# Patient Record
Sex: FEMALE | Race: WHITE | NOT HISPANIC OR LATINO | Employment: OTHER | URBAN - METROPOLITAN AREA
[De-identification: names, ages, dates, MRNs, and addresses within clinical notes are randomized per-mention and may not be internally consistent; named-entity substitution may affect disease eponyms.]

---

## 2019-08-19 ENCOUNTER — HOSPITAL ENCOUNTER (OUTPATIENT)
Dept: RADIOLOGY | Facility: HOSPITAL | Age: 74
Discharge: HOME | DRG: 189 | End: 2019-08-19
Attending: INTERNAL MEDICINE
Payer: MEDICARE

## 2019-08-19 ENCOUNTER — TELEPHONE (OUTPATIENT)
Dept: PULMONOLOGY | Facility: CLINIC | Age: 74
End: 2019-08-19

## 2019-08-19 ENCOUNTER — OFFICE VISIT (OUTPATIENT)
Dept: PULMONOLOGY | Facility: CLINIC | Age: 74
DRG: 189 | End: 2019-08-19
Payer: MEDICARE

## 2019-08-19 VITALS
HEIGHT: 58 IN | HEART RATE: 56 BPM | OXYGEN SATURATION: 95 % | BODY MASS INDEX: 26.93 KG/M2 | WEIGHT: 128.31 LBS | RESPIRATION RATE: 19 BRPM

## 2019-08-19 DIAGNOSIS — R06.02 SOBOE (SHORTNESS OF BREATH ON EXERTION): ICD-10-CM

## 2019-08-19 DIAGNOSIS — J96.11 CHRONIC HYPOXEMIC RESPIRATORY FAILURE: Primary | ICD-10-CM

## 2019-08-19 DIAGNOSIS — Z95.1 HX OF CABG: ICD-10-CM

## 2019-08-19 DIAGNOSIS — R06.83 SNORING: ICD-10-CM

## 2019-08-19 DIAGNOSIS — J90 BILATERAL PLEURAL EFFUSION: Primary | ICD-10-CM

## 2019-08-19 DIAGNOSIS — J90 BILATERAL PLEURAL EFFUSION: ICD-10-CM

## 2019-08-19 DIAGNOSIS — G47.19 EXCESSIVE DAYTIME SLEEPINESS: ICD-10-CM

## 2019-08-19 DIAGNOSIS — Z23 NEED FOR VACCINATION WITH 13-POLYVALENT PNEUMOCOCCAL CONJUGATE VACCINE: ICD-10-CM

## 2019-08-19 DIAGNOSIS — I50.9 CONGESTIVE HEART FAILURE, UNSPECIFIED HF CHRONICITY, UNSPECIFIED HEART FAILURE TYPE: ICD-10-CM

## 2019-08-19 DIAGNOSIS — R29.818 SUSPECTED SLEEP APNEA: ICD-10-CM

## 2019-08-19 PROCEDURE — 99999 PR PBB SHADOW E&M-NEW PATIENT-LVL III: ICD-10-PCS | Mod: PBBFAC,,, | Performed by: INTERNAL MEDICINE

## 2019-08-19 PROCEDURE — 99999 PR PBB SHADOW E&M-NEW PATIENT-LVL III: CPT | Mod: PBBFAC,,, | Performed by: INTERNAL MEDICINE

## 2019-08-19 PROCEDURE — 76604 US EXAM CHEST: CPT | Mod: TC

## 2019-08-19 PROCEDURE — 99205 PR OFFICE/OUTPT VISIT, NEW, LEVL V, 60-74 MIN: ICD-10-PCS | Mod: 25,S$PBB,, | Performed by: INTERNAL MEDICINE

## 2019-08-19 PROCEDURE — 99205 OFFICE O/P NEW HI 60 MIN: CPT | Mod: 25,S$PBB,, | Performed by: INTERNAL MEDICINE

## 2019-08-19 PROCEDURE — 99203 OFFICE O/P NEW LOW 30 MIN: CPT | Mod: PBBFAC,25 | Performed by: INTERNAL MEDICINE

## 2019-08-19 PROCEDURE — G0009 ADMIN PNEUMOCOCCAL VACCINE: HCPCS | Mod: PBBFAC

## 2019-08-19 PROCEDURE — 71046 XR CHEST PA AND LATERAL: ICD-10-PCS | Mod: 26,,, | Performed by: RADIOLOGY

## 2019-08-19 PROCEDURE — 71046 X-RAY EXAM CHEST 2 VIEWS: CPT | Mod: 26,,, | Performed by: RADIOLOGY

## 2019-08-19 PROCEDURE — 71046 X-RAY EXAM CHEST 2 VIEWS: CPT | Mod: TC

## 2019-08-19 RX ORDER — ISOSORBIDE MONONITRATE 30 MG/1
30 TABLET, EXTENDED RELEASE ORAL DAILY
Refills: 11 | COMMUNITY
Start: 2019-08-16 | End: 2020-02-19

## 2019-08-19 RX ORDER — NITROGLYCERIN 0.4 MG/1
0.4 TABLET SUBLINGUAL DAILY
Refills: 0 | COMMUNITY
Start: 2019-08-03 | End: 2020-02-19

## 2019-08-19 RX ORDER — NAPROXEN SODIUM 220 MG/1
81 TABLET, FILM COATED ORAL DAILY
COMMUNITY

## 2019-08-19 RX ORDER — METOPROLOL TARTRATE 50 MG/1
50 TABLET ORAL DAILY
Status: ON HOLD | COMMUNITY
Start: 2016-07-12 | End: 2019-08-23 | Stop reason: HOSPADM

## 2019-08-19 RX ORDER — ATORVASTATIN CALCIUM 40 MG/1
40 TABLET, FILM COATED ORAL DAILY
COMMUNITY

## 2019-08-19 RX ORDER — FUROSEMIDE 40 MG/1
40 TABLET ORAL 2 TIMES DAILY
COMMUNITY
End: 2020-01-08

## 2019-08-19 RX ORDER — LIDOCAINE HYDROCHLORIDE 10 MG/ML
1 INJECTION, SOLUTION EPIDURAL; INFILTRATION; INTRACAUDAL; PERINEURAL
Status: CANCELLED | OUTPATIENT
Start: 2019-08-19 | End: 2019-08-19

## 2019-08-19 RX ORDER — ECONAZOLE NITRATE 10 MG/G
1 CREAM TOPICAL
COMMUNITY
Start: 2019-07-26 | End: 2020-02-19

## 2019-08-19 NOTE — PATIENT INSTRUCTIONS
Heart healthy diet  Limit fluid intake 50-60 oz   Daily weights and to notify clinic if weight increases by more than 3 lbs in 1 day or 5 lbs in 1 week.       Your provider has scheduled you for a sleep study.   You should be receiving a phone call from the sleep lab shortly after your sleep study had been approved by your insurance. Please make sure you have your current phone number in the [facility] system. If you do not hear from anyone in the next 10 business days, please call the sleep lab at [phone] to schedule your sleep study.   The sleep studies are performed at [facility] seven nights a week. When you are scheduling your sleep study, they will also make you a follow up appointment with your provider. This follow up appointment will be 10-14 days after your sleep study to review the results. If it is noted that you do not have sleep apnea on your initial sleep study, you may receive a call back for a second night sleep study with the CPAP before you come back to the office.

## 2019-08-19 NOTE — H&P (VIEW-ONLY)
Initial Outpatient Pulmonary Evaluation       SUBJECTIVE:     Chief Complaint   Patient presents with    Congestive Heart Failure         History of Present Illness:    Patient is a 74 y.o. female referred for evaluation of chronic hypoxemic respiratory failure.    Patient known with CAD status post CABG about 8 years ago, late 2018 she underwent cardiac catheterization, no stents were placed.  Positive CAD.    Her cardiologist is Dr.Charles Laguerre with [facility].     She has been admitted twice with CHF exacerbation June 2019 and August 2019.    Currently wearing oxygen 24 hr.    She does complain of snoring, excessive daytime sleepiness with [place] Sleepiness Scale score today of 14, witnessed apnea by her family.    Denies smoking, denies personal family history of asthma.  No history of occupational exposure.    STOP - BANG Questionnaire:     1. Snoring : Do you snore loudly ?    Yes    2. Tired : Do you often feel tired, fatigued, or sleepy during daytime? Yes    3. Observed: Has anyone observed you stop breathing during your sleep?   Yes     4. Blood pressure : Do you have or are you being treated for high blood pressure?   Yes    5. BMI :BMI more than 35 kg/m2?   No    6. Age : Age over 50 yr old?   Yes    7. Neck circumference:   For male, is your shirt collar 17 inches / 43cm or larger?  For female, is your shirt collar 16 inches / 41cm or larger?    No    8. Gender: Gender male?   No    STOP BANG SCORE 5    High risk of DONAL: Yes 5 - 8  Intermediate risk of DONAL: Yes 3 - 4  Low risk of DONAL: Yes 0 - 2      References:   STOP Questionnaire   A Tool to Screen Patients for Obstructive Sleep Apnea: ZEUS Bauer.C.P.C., JAMEY Valero.B.B.S., Claudia Akins M.D.,Yvonne Gonzalez, Ph.D., Mariposa Bradley M.B.B.S.,_ Michele Fall.,_ José Manuel Martell M.D., Martin Rockwell F.R.C.P.C.; Anesthesiology 2008; 108:812-21 Copyright © 2008, the American  Society of Anesthesiologists, Inc. [employer]      Review of Systems   Constitutional: Negative for fever and chills.   HENT: Negative for nosebleeds.    Eyes: Negative for redness.   Respiratory: Positive for apnea, snoring, shortness of breath, dyspnea on extertion and somnolence. Negative for choking.    Cardiovascular: Positive for leg swelling.   Genitourinary: Negative for hematuria.   Endocrine: Negative for cold intolerance.    Musculoskeletal: Positive for arthralgias.   Skin: Negative for rash.   Gastrointestinal: Negative for vomiting.   Neurological: Negative for syncope.   Hematological: Negative for adenopathy.   Psychiatric/Behavioral: Positive for sleep disturbance. Negative for confusion.       Review of patient's allergies indicates:  No Known Allergies    Current Outpatient Medications   Medication Sig Dispense Refill    furosemide (LASIX) 40 MG tablet Take 40 mg by mouth 2 (two) times daily.      metoprolol tartrate (LOPRESSOR) 50 MG tablet Take 50 mg by mouth once daily.      aspirin 325 MG tablet Take 325 mg by mouth once daily.      atorvastatin (LIPITOR) 40 MG tablet Take 40 mg by mouth once daily.      econazole nitrate 1 % cream Apply 1 Doses/Fill topically as needed.      isosorbide mononitrate (IMDUR) 30 MG 24 hr tablet Take 30 mg by mouth 2 (two) times daily.  11    nitroGLYCERIN (NITROSTAT) 0.4 MG SL tablet Take 0.4 mg by mouth once daily.  0     No current facility-administered medications for this visit.        Past Medical History:   Diagnosis Date    CHF (congestive heart failure)     Diabetes     Hypertension     Renal arteriosclerosis      Past Surgical History:   Procedure Laterality Date    BYPASS GRAFT      GALLBLADDER SURGERY       History reviewed. No pertinent family history.  Social History     Tobacco Use    Smoking status: Never Smoker    Smokeless tobacco: Never Used   Substance Use Topics    Alcohol use: Not Currently      "Frequency: Never    Drug use: Not on file          OBJECTIVE:     Vital Signs (Most Recent)  Vital Signs  Pulse: (!) 56  Resp: 19  SpO2: 95 %(on 3 L)  Height and Weight  Height: 4' 10" (147.3 cm)  Weight: 58.2 kg (128 lb 4.9 oz)  BSA (Calculated - sq m): 1.54 sq meters  BMI (Calculated): 26.9  Weight in (lb) to have BMI = 25: 119.4]  Wt Readings from Last 2 Encounters:   08/19/19 58.2 kg (128 lb 4.9 oz)         Physical Exam:  Physical Exam   Constitutional: She is oriented to person, place, and time. She appears well-developed and well-nourished.   HENT:   Head: Normocephalic.   Neck: Neck supple.   Cardiovascular: Normal rate and regular rhythm.   Pulmonary/Chest: Normal expansion and effort normal. No stridor. No respiratory distress. She exhibits no tenderness.   Breath sounds decreased at bilateral bases   Abdominal: Soft.   Musculoskeletal: She exhibits edema. She exhibits no tenderness.   Lymphadenopathy:     She has no cervical adenopathy.   Neurological: She is alert and oriented to person, place, and time. Gait normal.   Skin: Skin is warm.   Psychiatric: She has a normal mood and affect. Her behavior is normal. Judgment and thought content normal.   Nursing note and vitals reviewed.      Laboratory  No results found for: WBC, RBC, HGB, HCT, MCV, MCH, MCHC, RDW, PLT, MPV, GRAN, LYMPH, MONO, EOS, BASO, EOSINOPHIL, BASOPHIL    BMP  No results found for: NA, K, CL, CO2, BUN, CREATININE, CALCIUM, ANIONGAP, ESTGFRAFRICA, EGFRNONAA, AST, ALT, PROT    No results found for: BNP    No results found for: TSH    No results found for: SEDRATE    No results found for: CRP      Diagnostic Results:    I have personally reviewed today the following studies :    Chest x-ray 8/18 2019 pulmonary congestion cardiomegaly and bilateral pleural effusion.      ASSESSMENT/PLAN:     Chronic hypoxemic respiratory failure  -     Brain natriuretic peptide; Future; Expected date: 08/19/2019  -     Stress test, pulmonary; Future  -     " Complete PFT without bronchodilator; Future    Congestive heart failure, unspecified HF chronicity, unspecified heart failure type  -     X-Ray Chest PA And Lateral; Future; Expected date: 08/19/2019  -     Brain natriuretic peptide; Future; Expected date: 08/19/2019  -     CBC auto differential; Future; Expected date: 08/19/2019  -     Basic metabolic panel; Future; Expected date: 08/19/2019  -     Stress test, pulmonary; Future    Hx of CABG    Suspected sleep apnea  -     Polysomnogram (CPAP will be added if patient meets diagnostic criteria.); Future    Snoring  -     Polysomnogram (CPAP will be added if patient meets diagnostic criteria.); Future    Excessive daytime sleepiness  -     Polysomnogram (CPAP will be added if patient meets diagnostic criteria.); Future    SOBOE (shortness of breath on exertion)  -     Stress test, pulmonary; Future  -     Complete PFT without bronchodilator; Future    Need for vaccination with 13-polyvalent pneumococcal conjugate vaccine  -     Pneumococcal Conjugate Vaccine (13 Valent) (IM)    Bilateral pleural effusion    Ultrasound of the chest bilateral today.  Might need thoracentesis.    Continue Lasix 40 mg twice a day.    Heart healthy diet  Limit fluid intake 50-60 oz   Daily weights and to notify clinic if weight increases by more than 3 lbs in 1 day or 5 lbs in 1 week.       MEDICAL DECISION MAKING: Moderate to high complexity.  Overall, the multiple problems listed are of moderate to high severity that may impact quality of life and activities of daily living. Side effects of medications, treatment plan as well as options and alternatives reviewed and discussed with patient. There was counseling of patient concerning these issues.     TIME SPENT WITH PATIENT: Time spent: 60 minutes in face to face  discussion concerning diagnosis, prognosis, review of lab and test results, benefits of treatment as well as management of disease, counseling of patient and coordination of  care between various health  care providers . Greater than half the time spent was used for coordination of care and counseling of patient.           Follow up in about 6 weeks (around 9/30/2019).    This note was prepared using voice recognition system and is likely to have sound alike errors that may have been overlooked even after proof reading.  Please call me with any questions    Discussed diagnosis, its evaluation, treatment and usual course. All questions answered.    Thank you for the courtesy of participating in the care of this patient    John Landry MD

## 2019-08-19 NOTE — PROGRESS NOTES
Initial Outpatient Pulmonary Evaluation       SUBJECTIVE:     Chief Complaint   Patient presents with    Congestive Heart Failure         History of Present Illness:    Patient is a 74 y.o. female referred for evaluation of chronic hypoxemic respiratory failure.    Patient known with CAD status post CABG about 8 years ago, late 2018 she underwent cardiac catheterization, no stents were placed.  Positive CAD.    Her cardiologist is Dr.Charles Laguerre with [facility].     She has been admitted twice with CHF exacerbation June 2019 and August 2019.    Currently wearing oxygen 24 hr.    She does complain of snoring, excessive daytime sleepiness with [place] Sleepiness Scale score today of 14, witnessed apnea by her family.    Denies smoking, denies personal family history of asthma.  No history of occupational exposure.    STOP - BANG Questionnaire:     1. Snoring : Do you snore loudly ?    Yes    2. Tired : Do you often feel tired, fatigued, or sleepy during daytime? Yes    3. Observed: Has anyone observed you stop breathing during your sleep?   Yes     4. Blood pressure : Do you have or are you being treated for high blood pressure?   Yes    5. BMI :BMI more than 35 kg/m2?   No    6. Age : Age over 50 yr old?   Yes    7. Neck circumference:   For male, is your shirt collar 17 inches / 43cm or larger?  For female, is your shirt collar 16 inches / 41cm or larger?    No    8. Gender: Gender male?   No    STOP BANG SCORE 5    High risk of DONAL: Yes 5 - 8  Intermediate risk of DONAL: Yes 3 - 4  Low risk of DONAL: Yes 0 - 2      References:   STOP Questionnaire   A Tool to Screen Patients for Obstructive Sleep Apnea: ZEUS Bauer.C.P.C., JAMEY Valero.B.B.S., Claudia Akins M.D.,Yvonne Gonzalez, Ph.D., Mariposa Bradley M.B.B.S.,_ Michele Fall.,_ José Manuel Martell M.D., Martin Rockwell F.R.C.P.C.; Anesthesiology 2008; 108:812-21 Copyright © 2008, the American  Society of Anesthesiologists, Inc. [employer]      Review of Systems   Constitutional: Negative for fever and chills.   HENT: Negative for nosebleeds.    Eyes: Negative for redness.   Respiratory: Positive for apnea, snoring, shortness of breath, dyspnea on extertion and somnolence. Negative for choking.    Cardiovascular: Positive for leg swelling.   Genitourinary: Negative for hematuria.   Endocrine: Negative for cold intolerance.    Musculoskeletal: Positive for arthralgias.   Skin: Negative for rash.   Gastrointestinal: Negative for vomiting.   Neurological: Negative for syncope.   Hematological: Negative for adenopathy.   Psychiatric/Behavioral: Positive for sleep disturbance. Negative for confusion.       Review of patient's allergies indicates:  No Known Allergies    Current Outpatient Medications   Medication Sig Dispense Refill    furosemide (LASIX) 40 MG tablet Take 40 mg by mouth 2 (two) times daily.      metoprolol tartrate (LOPRESSOR) 50 MG tablet Take 50 mg by mouth once daily.      aspirin 325 MG tablet Take 325 mg by mouth once daily.      atorvastatin (LIPITOR) 40 MG tablet Take 40 mg by mouth once daily.      econazole nitrate 1 % cream Apply 1 Doses/Fill topically as needed.      isosorbide mononitrate (IMDUR) 30 MG 24 hr tablet Take 30 mg by mouth 2 (two) times daily.  11    nitroGLYCERIN (NITROSTAT) 0.4 MG SL tablet Take 0.4 mg by mouth once daily.  0     No current facility-administered medications for this visit.        Past Medical History:   Diagnosis Date    CHF (congestive heart failure)     Diabetes     Hypertension     Renal arteriosclerosis      Past Surgical History:   Procedure Laterality Date    BYPASS GRAFT      GALLBLADDER SURGERY       History reviewed. No pertinent family history.  Social History     Tobacco Use    Smoking status: Never Smoker    Smokeless tobacco: Never Used   Substance Use Topics    Alcohol use: Not Currently      "Frequency: Never    Drug use: Not on file          OBJECTIVE:     Vital Signs (Most Recent)  Vital Signs  Pulse: (!) 56  Resp: 19  SpO2: 95 %(on 3 L)  Height and Weight  Height: 4' 10" (147.3 cm)  Weight: 58.2 kg (128 lb 4.9 oz)  BSA (Calculated - sq m): 1.54 sq meters  BMI (Calculated): 26.9  Weight in (lb) to have BMI = 25: 119.4]  Wt Readings from Last 2 Encounters:   08/19/19 58.2 kg (128 lb 4.9 oz)         Physical Exam:  Physical Exam   Constitutional: She is oriented to person, place, and time. She appears well-developed and well-nourished.   HENT:   Head: Normocephalic.   Neck: Neck supple.   Cardiovascular: Normal rate and regular rhythm.   Pulmonary/Chest: Normal expansion and effort normal. No stridor. No respiratory distress. She exhibits no tenderness.   Breath sounds decreased at bilateral bases   Abdominal: Soft.   Musculoskeletal: She exhibits edema. She exhibits no tenderness.   Lymphadenopathy:     She has no cervical adenopathy.   Neurological: She is alert and oriented to person, place, and time. Gait normal.   Skin: Skin is warm.   Psychiatric: She has a normal mood and affect. Her behavior is normal. Judgment and thought content normal.   Nursing note and vitals reviewed.      Laboratory  No results found for: WBC, RBC, HGB, HCT, MCV, MCH, MCHC, RDW, PLT, MPV, GRAN, LYMPH, MONO, EOS, BASO, EOSINOPHIL, BASOPHIL    BMP  No results found for: NA, K, CL, CO2, BUN, CREATININE, CALCIUM, ANIONGAP, ESTGFRAFRICA, EGFRNONAA, AST, ALT, PROT    No results found for: BNP    No results found for: TSH    No results found for: SEDRATE    No results found for: CRP      Diagnostic Results:    I have personally reviewed today the following studies :    Chest x-ray 8/18 2019 pulmonary congestion cardiomegaly and bilateral pleural effusion.      ASSESSMENT/PLAN:     Chronic hypoxemic respiratory failure  -     Brain natriuretic peptide; Future; Expected date: 08/19/2019  -     Stress test, pulmonary; Future  -     " Complete PFT without bronchodilator; Future    Congestive heart failure, unspecified HF chronicity, unspecified heart failure type  -     X-Ray Chest PA And Lateral; Future; Expected date: 08/19/2019  -     Brain natriuretic peptide; Future; Expected date: 08/19/2019  -     CBC auto differential; Future; Expected date: 08/19/2019  -     Basic metabolic panel; Future; Expected date: 08/19/2019  -     Stress test, pulmonary; Future    Hx of CABG    Suspected sleep apnea  -     Polysomnogram (CPAP will be added if patient meets diagnostic criteria.); Future    Snoring  -     Polysomnogram (CPAP will be added if patient meets diagnostic criteria.); Future    Excessive daytime sleepiness  -     Polysomnogram (CPAP will be added if patient meets diagnostic criteria.); Future    SOBOE (shortness of breath on exertion)  -     Stress test, pulmonary; Future  -     Complete PFT without bronchodilator; Future    Need for vaccination with 13-polyvalent pneumococcal conjugate vaccine  -     Pneumococcal Conjugate Vaccine (13 Valent) (IM)    Bilateral pleural effusion    Ultrasound of the chest bilateral today.  Might need thoracentesis.    Continue Lasix 40 mg twice a day.    Heart healthy diet  Limit fluid intake 50-60 oz   Daily weights and to notify clinic if weight increases by more than 3 lbs in 1 day or 5 lbs in 1 week.       MEDICAL DECISION MAKING: Moderate to high complexity.  Overall, the multiple problems listed are of moderate to high severity that may impact quality of life and activities of daily living. Side effects of medications, treatment plan as well as options and alternatives reviewed and discussed with patient. There was counseling of patient concerning these issues.     TIME SPENT WITH PATIENT: Time spent: 60 minutes in face to face  discussion concerning diagnosis, prognosis, review of lab and test results, benefits of treatment as well as management of disease, counseling of patient and coordination of  care between various health  care providers . Greater than half the time spent was used for coordination of care and counseling of patient.           Follow up in about 6 weeks (around 9/30/2019).    This note was prepared using voice recognition system and is likely to have sound alike errors that may have been overlooked even after proof reading.  Please call me with any questions    Discussed diagnosis, its evaluation, treatment and usual course. All questions answered.    Thank you for the courtesy of participating in the care of this patient    John Landry MD

## 2019-08-22 ENCOUNTER — HOSPITAL ENCOUNTER (INPATIENT)
Facility: HOSPITAL | Age: 74
LOS: 1 days | Discharge: HOME | DRG: 189 | End: 2019-08-23
Attending: INTERNAL MEDICINE | Admitting: EMERGENCY MEDICINE
Payer: MEDICARE

## 2019-08-22 DIAGNOSIS — E78.49 OTHER HYPERLIPIDEMIA: Chronic | ICD-10-CM

## 2019-08-22 DIAGNOSIS — R00.1 BRADYCARDIA, SINUS: ICD-10-CM

## 2019-08-22 DIAGNOSIS — J90 BILATERAL PLEURAL EFFUSION: Primary | ICD-10-CM

## 2019-08-22 DIAGNOSIS — I10 ESSENTIAL HYPERTENSION: Chronic | ICD-10-CM

## 2019-08-22 DIAGNOSIS — Z79.4 TYPE 2 DIABETES MELLITUS WITH HYPERGLYCEMIA, WITH LONG-TERM CURRENT USE OF INSULIN: Chronic | ICD-10-CM

## 2019-08-22 DIAGNOSIS — E11.65 TYPE 2 DIABETES MELLITUS WITH HYPERGLYCEMIA, WITH LONG-TERM CURRENT USE OF INSULIN: Chronic | ICD-10-CM

## 2019-08-22 DIAGNOSIS — J96.21 ACUTE ON CHRONIC RESPIRATORY FAILURE WITH HYPOXIA: ICD-10-CM

## 2019-08-22 DIAGNOSIS — R09.02 HYPOXEMIA: ICD-10-CM

## 2019-08-22 DIAGNOSIS — I25.10 CORONARY ARTERY DISEASE INVOLVING NATIVE CORONARY ARTERY OF NATIVE HEART WITHOUT ANGINA PECTORIS: Chronic | ICD-10-CM

## 2019-08-22 PROBLEM — J96.01 ACUTE HYPOXEMIC RESPIRATORY FAILURE: Status: ACTIVE | Noted: 2019-08-22

## 2019-08-22 LAB
ALBUMIN FLD-MCNC: 1.5 G/DL
ALBUMIN SERPL BCP-MCNC: 3.4 G/DL (ref 3.5–5.2)
ALP SERPL-CCNC: 92 U/L (ref 55–135)
ALT SERPL W/O P-5'-P-CCNC: 27 U/L (ref 10–44)
ANION GAP SERPL CALC-SCNC: 13 MMOL/L (ref 8–16)
APPEARANCE FLD: CLEAR
AST SERPL-CCNC: 19 U/L (ref 10–40)
BASOPHILS # BLD AUTO: 0.04 K/UL (ref 0–0.2)
BASOPHILS NFR BLD: 0.4 % (ref 0–1.9)
BILIRUB SERPL-MCNC: 0.5 MG/DL (ref 0.1–1)
BNP SERPL-MCNC: 574 PG/ML (ref 0–99)
BODY FLD TYPE: NORMAL
BODY FLUID SOURCE, LDH: NORMAL
BUN SERPL-MCNC: 38 MG/DL (ref 8–23)
CALCIUM SERPL-MCNC: 9.7 MG/DL (ref 8.7–10.5)
CHLORIDE SERPL-SCNC: 99 MMOL/L (ref 95–110)
CO2 SERPL-SCNC: 29 MMOL/L (ref 23–29)
COLOR FLD: YELLOW
CREAT SERPL-MCNC: 1.2 MG/DL (ref 0.5–1.4)
DIFFERENTIAL METHOD: ABNORMAL
EOSINOPHIL # BLD AUTO: 0.5 K/UL (ref 0–0.5)
EOSINOPHIL NFR BLD: 4.6 % (ref 0–8)
ERYTHROCYTE [DISTWIDTH] IN BLOOD BY AUTOMATED COUNT: 15.2 % (ref 11.5–14.5)
EST. GFR  (AFRICAN AMERICAN): 51 ML/MIN/1.73 M^2
EST. GFR  (NON AFRICAN AMERICAN): 45 ML/MIN/1.73 M^2
GLUCOSE FLD-MCNC: 136 MG/DL
GLUCOSE SERPL-MCNC: 101 MG/DL (ref 70–110)
HCT VFR BLD AUTO: 33.7 % (ref 37–48.5)
HGB BLD-MCNC: 10.8 G/DL (ref 12–16)
LDH FLD L TO P-CCNC: 58 U/L
LYMPHOCYTES # BLD AUTO: 0.6 K/UL (ref 1–4.8)
LYMPHOCYTES NFR BLD: 5.3 % (ref 18–48)
LYMPHOCYTES NFR FLD MANUAL: 27 %
MCH RBC QN AUTO: 30.3 PG (ref 27–31)
MCHC RBC AUTO-ENTMCNC: 32 G/DL (ref 32–36)
MCV RBC AUTO: 94 FL (ref 82–98)
MONOCYTES # BLD AUTO: 0.6 K/UL (ref 0.3–1)
MONOCYTES NFR BLD: 6.1 % (ref 4–15)
MONOS+MACROS NFR FLD MANUAL: 71 %
NEUTROPHILS # BLD AUTO: 8.8 K/UL (ref 1.8–7.7)
NEUTROPHILS NFR BLD: 83.9 % (ref 38–73)
NEUTROPHILS NFR FLD MANUAL: 2 %
PLATELET # BLD AUTO: 351 K/UL (ref 150–350)
PMV BLD AUTO: 9.3 FL (ref 9.2–12.9)
POCT GLUCOSE: 128 MG/DL (ref 70–110)
POCT GLUCOSE: 199 MG/DL (ref 70–110)
POCT GLUCOSE: 43 MG/DL (ref 70–110)
POTASSIUM SERPL-SCNC: 3.8 MMOL/L (ref 3.5–5.1)
PROT FLD-MCNC: 2.1 G/DL
PROT SERPL-MCNC: 7.2 G/DL (ref 6–8.4)
RBC # BLD AUTO: 3.57 M/UL (ref 4–5.4)
SODIUM SERPL-SCNC: 141 MMOL/L (ref 136–145)
SPECIMEN SOURCE: NORMAL
TROPONIN I SERPL DL<=0.01 NG/ML-MCNC: <0.006 NG/ML (ref 0–0.03)
WBC # BLD AUTO: 10.49 K/UL (ref 3.9–12.7)
WBC # FLD: 172 /CU MM

## 2019-08-22 PROCEDURE — 27000190 HC CPAP FULL FACE MASK W/VALVE

## 2019-08-22 PROCEDURE — 87205 SMEAR GRAM STAIN: CPT

## 2019-08-22 PROCEDURE — 80053 COMPREHEN METABOLIC PANEL: CPT

## 2019-08-22 PROCEDURE — 83880 ASSAY OF NATRIURETIC PEPTIDE: CPT

## 2019-08-22 PROCEDURE — 84484 ASSAY OF TROPONIN QUANT: CPT

## 2019-08-22 PROCEDURE — 87070 CULTURE OTHR SPECIMN AEROBIC: CPT

## 2019-08-22 PROCEDURE — 85025 COMPLETE CBC W/AUTO DIFF WBC: CPT

## 2019-08-22 PROCEDURE — 32555 PR THORACEN W/IMAG GUIDANCE: ICD-10-PCS | Mod: RT,,, | Performed by: INTERNAL MEDICINE

## 2019-08-22 PROCEDURE — 88305 TISSUE EXAM BY PATHOLOGIST: CPT | Mod: 26,,, | Performed by: PATHOLOGY

## 2019-08-22 PROCEDURE — 94660 CPAP INITIATION&MGMT: CPT

## 2019-08-22 PROCEDURE — 94640 AIRWAY INHALATION TREATMENT: CPT

## 2019-08-22 PROCEDURE — 88112 CYTOPATH CELL ENHANCE TECH: CPT | Mod: 26,,, | Performed by: PATHOLOGY

## 2019-08-22 PROCEDURE — 25000242 PHARM REV CODE 250 ALT 637 W/ HCPCS: Performed by: NURSE PRACTITIONER

## 2019-08-22 PROCEDURE — 93010 ELECTROCARDIOGRAM REPORT: CPT | Mod: ,,, | Performed by: INTERNAL MEDICINE

## 2019-08-22 PROCEDURE — 93005 ELECTROCARDIOGRAM TRACING: CPT

## 2019-08-22 PROCEDURE — 93010 EKG 12-LEAD: ICD-10-PCS | Mod: ,,, | Performed by: INTERNAL MEDICINE

## 2019-08-22 PROCEDURE — 89051 BODY FLUID CELL COUNT: CPT

## 2019-08-22 PROCEDURE — 99900035 HC TECH TIME PER 15 MIN (STAT)

## 2019-08-22 PROCEDURE — 88112 CYTOLOGY SPECIMEN- MEDICAL CYTOLOGY (FLUID/WASH/BRUSH): ICD-10-PCS | Mod: 26,,, | Performed by: PATHOLOGY

## 2019-08-22 PROCEDURE — 25000003 PHARM REV CODE 250: Performed by: NURSE PRACTITIONER

## 2019-08-22 PROCEDURE — 20000000 HC ICU ROOM

## 2019-08-22 PROCEDURE — 83615 LACTATE (LD) (LDH) ENZYME: CPT

## 2019-08-22 PROCEDURE — 99291 CRITICAL CARE FIRST HOUR: CPT | Mod: 25,,, | Performed by: NURSE PRACTITIONER

## 2019-08-22 PROCEDURE — 84157 ASSAY OF PROTEIN OTHER: CPT

## 2019-08-22 PROCEDURE — 63600175 PHARM REV CODE 636 W HCPCS: Performed by: NURSE PRACTITIONER

## 2019-08-22 PROCEDURE — 82042 OTHER SOURCE ALBUMIN QUAN EA: CPT

## 2019-08-22 PROCEDURE — 32555 ASPIRATE PLEURA W/ IMAGING: CPT | Mod: RT | Performed by: INTERNAL MEDICINE

## 2019-08-22 PROCEDURE — 88305 CYTOLOGY SPECIMEN- MEDICAL CYTOLOGY (FLUID/WASH/BRUSH): ICD-10-PCS | Mod: 26,,, | Performed by: PATHOLOGY

## 2019-08-22 PROCEDURE — 88305 TISSUE EXAM BY PATHOLOGIST: CPT | Performed by: PATHOLOGY

## 2019-08-22 PROCEDURE — 99291 PR CRITICAL CARE, E/M 30-74 MINUTES: ICD-10-PCS | Mod: 25,,, | Performed by: NURSE PRACTITIONER

## 2019-08-22 PROCEDURE — 82945 GLUCOSE OTHER FLUID: CPT

## 2019-08-22 PROCEDURE — 25000242 PHARM REV CODE 250 ALT 637 W/ HCPCS: Performed by: INTERNAL MEDICINE

## 2019-08-22 PROCEDURE — 32555 ASPIRATE PLEURA W/ IMAGING: CPT | Mod: RT,,, | Performed by: INTERNAL MEDICINE

## 2019-08-22 PROCEDURE — 27000221 HC OXYGEN, UP TO 24 HOURS

## 2019-08-22 RX ORDER — CARVEDILOL 25 MG/1
25 TABLET ORAL 2 TIMES DAILY WITH MEALS
COMMUNITY
End: 2020-02-19

## 2019-08-22 RX ORDER — IPRATROPIUM BROMIDE AND ALBUTEROL SULFATE 2.5; .5 MG/3ML; MG/3ML
3 SOLUTION RESPIRATORY (INHALATION)
Status: DISCONTINUED | OUTPATIENT
Start: 2019-08-22 | End: 2019-08-23 | Stop reason: HOSPADM

## 2019-08-22 RX ORDER — ASPIRIN 325 MG
325 TABLET ORAL DAILY
Status: DISCONTINUED | OUTPATIENT
Start: 2019-08-23 | End: 2019-08-23 | Stop reason: HOSPADM

## 2019-08-22 RX ORDER — LOSARTAN POTASSIUM 50 MG/1
50 TABLET ORAL DAILY
Status: DISCONTINUED | OUTPATIENT
Start: 2019-08-23 | End: 2019-08-23 | Stop reason: HOSPADM

## 2019-08-22 RX ORDER — FUROSEMIDE 10 MG/ML
40 INJECTION INTRAMUSCULAR; INTRAVENOUS ONCE
Status: COMPLETED | OUTPATIENT
Start: 2019-08-22 | End: 2019-08-22

## 2019-08-22 RX ORDER — ISOSORBIDE MONONITRATE 30 MG/1
30 TABLET, EXTENDED RELEASE ORAL DAILY
Status: DISCONTINUED | OUTPATIENT
Start: 2019-08-23 | End: 2019-08-23 | Stop reason: HOSPADM

## 2019-08-22 RX ORDER — INSULIN ASPART 100 [IU]/ML
10 INJECTION, SOLUTION INTRAVENOUS; SUBCUTANEOUS
COMMUNITY
End: 2020-01-08

## 2019-08-22 RX ORDER — GLUCAGON 1 MG
1 KIT INJECTION
Status: DISCONTINUED | OUTPATIENT
Start: 2019-08-22 | End: 2019-08-23 | Stop reason: HOSPADM

## 2019-08-22 RX ORDER — IPRATROPIUM BROMIDE AND ALBUTEROL SULFATE 2.5; .5 MG/3ML; MG/3ML
3 SOLUTION RESPIRATORY (INHALATION) ONCE
Status: DISCONTINUED | OUTPATIENT
Start: 2019-08-22 | End: 2019-08-22

## 2019-08-22 RX ORDER — CARVEDILOL 12.5 MG/1
25 TABLET ORAL 2 TIMES DAILY WITH MEALS
Status: DISCONTINUED | OUTPATIENT
Start: 2019-08-22 | End: 2019-08-22

## 2019-08-22 RX ORDER — CALCIUM CARBONATE 500(1250)
1 TABLET ORAL 2 TIMES DAILY
COMMUNITY
End: 2020-01-08

## 2019-08-22 RX ORDER — ACETAMINOPHEN 325 MG/1
650 TABLET ORAL EVERY 6 HOURS PRN
Status: DISCONTINUED | OUTPATIENT
Start: 2019-08-22 | End: 2019-08-23 | Stop reason: HOSPADM

## 2019-08-22 RX ORDER — METHYLPREDNISOLONE 4 MG
1 TABLET, DOSE PACK ORAL 2 TIMES DAILY
COMMUNITY
End: 2020-01-08

## 2019-08-22 RX ORDER — ASCORBIC ACID 500 MG
500 TABLET ORAL DAILY
COMMUNITY
End: 2020-01-08

## 2019-08-22 RX ORDER — HYDRALAZINE HYDROCHLORIDE 100 MG/1
100 TABLET, FILM COATED ORAL 2 TIMES DAILY
Status: ON HOLD | COMMUNITY
End: 2019-08-23 | Stop reason: HOSPADM

## 2019-08-22 RX ORDER — LOSARTAN POTASSIUM 50 MG/1
50 TABLET ORAL 2 TIMES DAILY
COMMUNITY
End: 2020-01-08

## 2019-08-22 RX ORDER — ENOXAPARIN SODIUM 100 MG/ML
40 INJECTION SUBCUTANEOUS EVERY 24 HOURS
Status: DISCONTINUED | OUTPATIENT
Start: 2019-08-22 | End: 2019-08-23 | Stop reason: HOSPADM

## 2019-08-22 RX ORDER — ONDANSETRON 2 MG/ML
4 INJECTION INTRAMUSCULAR; INTRAVENOUS EVERY 8 HOURS PRN
Status: DISCONTINUED | OUTPATIENT
Start: 2019-08-22 | End: 2019-08-23 | Stop reason: HOSPADM

## 2019-08-22 RX ORDER — HYDRALAZINE HYDROCHLORIDE 50 MG/1
100 TABLET, FILM COATED ORAL 2 TIMES DAILY
Status: DISCONTINUED | OUTPATIENT
Start: 2019-08-22 | End: 2019-08-22

## 2019-08-22 RX ORDER — IPRATROPIUM BROMIDE AND ALBUTEROL SULFATE 2.5; .5 MG/3ML; MG/3ML
3 SOLUTION RESPIRATORY (INHALATION) ONCE
Status: COMPLETED | OUTPATIENT
Start: 2019-08-22 | End: 2019-08-22

## 2019-08-22 RX ORDER — IBUPROFEN 200 MG
16 TABLET ORAL
Status: DISCONTINUED | OUTPATIENT
Start: 2019-08-22 | End: 2019-08-23 | Stop reason: HOSPADM

## 2019-08-22 RX ORDER — INSULIN ASPART 100 [IU]/ML
1-10 INJECTION, SOLUTION INTRAVENOUS; SUBCUTANEOUS
Status: DISCONTINUED | OUTPATIENT
Start: 2019-08-22 | End: 2019-08-23 | Stop reason: HOSPADM

## 2019-08-22 RX ORDER — ATORVASTATIN CALCIUM 40 MG/1
40 TABLET, FILM COATED ORAL DAILY
Status: DISCONTINUED | OUTPATIENT
Start: 2019-08-23 | End: 2019-08-23 | Stop reason: HOSPADM

## 2019-08-22 RX ORDER — CHOLECALCIFEROL (VITAMIN D3) 25 MCG
1000 TABLET ORAL NIGHTLY
COMMUNITY
End: 2020-01-08

## 2019-08-22 RX ORDER — FUROSEMIDE 40 MG/1
40 TABLET ORAL 2 TIMES DAILY
Status: DISCONTINUED | OUTPATIENT
Start: 2019-08-23 | End: 2019-08-23 | Stop reason: HOSPADM

## 2019-08-22 RX ORDER — DOCUSATE SODIUM 100 MG/1
100 CAPSULE, LIQUID FILLED ORAL DAILY
Status: DISCONTINUED | OUTPATIENT
Start: 2019-08-23 | End: 2019-08-23 | Stop reason: HOSPADM

## 2019-08-22 RX ORDER — LANOLIN ALCOHOL/MO/W.PET/CERES
400 CREAM (GRAM) TOPICAL 2 TIMES DAILY
COMMUNITY
End: 2020-01-08

## 2019-08-22 RX ORDER — LIDOCAINE HYDROCHLORIDE 10 MG/ML
1 INJECTION, SOLUTION EPIDURAL; INFILTRATION; INTRACAUDAL; PERINEURAL
Status: DISCONTINUED | OUTPATIENT
Start: 2019-08-22 | End: 2019-08-22 | Stop reason: HOSPADM

## 2019-08-22 RX ORDER — IBUPROFEN 200 MG
24 TABLET ORAL
Status: DISCONTINUED | OUTPATIENT
Start: 2019-08-22 | End: 2019-08-23 | Stop reason: HOSPADM

## 2019-08-22 RX ORDER — BISACODYL 10 MG
10 SUPPOSITORY, RECTAL RECTAL DAILY PRN
Status: DISCONTINUED | OUTPATIENT
Start: 2019-08-22 | End: 2019-08-23 | Stop reason: HOSPADM

## 2019-08-22 RX ORDER — FAMOTIDINE 20 MG/1
20 TABLET, FILM COATED ORAL DAILY
Status: DISCONTINUED | OUTPATIENT
Start: 2019-08-22 | End: 2019-08-23

## 2019-08-22 RX ORDER — NIFEDIPINE 60 MG/1
60 TABLET, EXTENDED RELEASE ORAL 2 TIMES DAILY
COMMUNITY

## 2019-08-22 RX ADMIN — IPRATROPIUM BROMIDE AND ALBUTEROL SULFATE 3 ML: .5; 3 SOLUTION RESPIRATORY (INHALATION) at 07:08

## 2019-08-22 RX ADMIN — IPRATROPIUM BROMIDE AND ALBUTEROL SULFATE 3 ML: .5; 3 SOLUTION RESPIRATORY (INHALATION) at 01:08

## 2019-08-22 RX ADMIN — FAMOTIDINE 20 MG: 20 TABLET, FILM COATED ORAL at 05:08

## 2019-08-22 RX ADMIN — FUROSEMIDE 40 MG: 10 INJECTION, SOLUTION INTRAMUSCULAR; INTRAVENOUS at 05:08

## 2019-08-22 RX ADMIN — ENOXAPARIN SODIUM 40 MG: 100 INJECTION SUBCUTANEOUS at 05:08

## 2019-08-22 NOTE — ASSESSMENT & PLAN NOTE
Continue ASA, Imdur, Statin, and ARB   Hold B blocker for now due to Bradycardia   Telemetry monitor

## 2019-08-22 NOTE — DISCHARGE SUMMARY
Date of Procedure: 08/22/2019      Procedure: Thoracentesis     Indications: Therapeutic     Pre-Operative Diagnosis:  Right side effusion      Post-Operative Diagnosis: same     Anesthesia: local     Technical Procedures Used: US guided over the needle cath thoracentesis      Description of the Findings of the Procedure: 700 ml right side effsuion , about 400 ml left side      Consent: Informed consent was obtained. Risks of the procedure were discussed including: infection, bleeding, pain, pneumothorax.     Under sterile conditions the patient was positioned. Chloraprep  solution and sterile drapes were utilized. 5 cc 1% plain lidocaine was used to anesthetize between the rib space after localized under ultrasound. Fluid was obtained after catheter inserted without  difficulty and suction applied with minimal blood loss.  A dressing was applied to the wound and wound care instructions were provided.      800 ml of clear pleural fluid was obtained. A sample was sent to Pathology and cell counts, as well as for infection analysis.     Plan:     A follow up chest x-ray was ordered. Yes  Tylenol 650 mg. for pain.     Significant Surgical Tasks Conducted by the Assistant(s), if Applicable:     Complications: none     Estimated Blood Loss (EBL): Minimal     Attestation: I performed the procedure.  John Landry M.D

## 2019-08-22 NOTE — OP NOTE
Date of Procedure: 08/22/2019      Procedure: Thoracentesis     Indications: Therapeutic     Pre-Operative Diagnosis:  Right side effusion      Post-Operative Diagnosis: same     Anesthesia: local     Technical Procedures Used: US guided over the needle cath thoracentesis      Description of the Findings of the Procedure: 700 ml right side effsuion , about 400 ml left side      Consent: Informed consent was obtained. Risks of the procedure were discussed including: infection, bleeding, pain, pneumothorax.     Under sterile conditions the patient was positioned. Chloraprep  solution and sterile drapes were utilized. 5 cc 1% plain lidocaine was used to anesthetize between the rib space after localized under ultrasound. Fluid was obtained after catheter inserted without  difficulty and suction applied with minimal blood loss.  A dressing was applied to the wound and wound care instructions were provided.      800 ml of clear pleural fluid was obtained. A sample was sent to Pathology and cell counts, as well as for infection analysis.     Plan:     A follow up chest x-ray was ordered. Yes.   Tylenol 650 mg. for pain.     Significant Surgical Tasks Conducted by the Assistant(s), if Applicable:     Complications: acute on chronic hypoxemia and resp distress, stable on BIPAP. Will admit patient for acute on chronciresp failure / possible re expansion pulm edema, no PTX noted on CXR post thoracentesis on the right .     Estimated Blood Loss (EBL): Minimal     Attestation: I performed the procedure.      John Landry M.D

## 2019-08-22 NOTE — PLAN OF CARE
Problem: Adult Inpatient Plan of Care  Goal: Plan of Care Review  Outcome: Ongoing (interventions implemented as appropriate)  Patient admitted s/p thoracentesis. Transferred on Bipap from endo to ICU. Weaned to 2L NC by NP.tolerated well. OOB to toilet. desat to 88%, but recovered quickly once back to bed. O2 sat=95%. Home BP meds restarted. Pt CBG=43. Pt requesting juice and crackers instead of glucose tabs. NP notified. David crackers and apple juice given. Diet ordered. Repositions self in bed.

## 2019-08-22 NOTE — H&P
[facility] Hospital Medicine  History & Physical    Patient Name: Mercy Woodruff  MRN: [id number]  Admission Date: 2019  Attending Physician: Rashaun Briscoe MD   Primary Care Provider: Manda Almendarez MD         Patient information was obtained from patient, relative(s) and ER records.     Subjective:     Principal Problem:Acute on chronic respiratory failure with hypoxia    Chief Complaint: No chief complaint on file.       HPI: Ms Woodruff is a 74 year old female with PMHx of CAD, HTN, HLD, DM who underwent Rt thoracenthesis on today by Dr Landry. She experienced some shortness of breath post procedure requiring application of BiPap. Patient was admitted to ICU for further evaluation. Troponin negative. , which is down from 939 three days ago. Vital signs and labs stable. Bipap has currently been wean, she it tolerating NC. Patient is being admitted to ICU under the care of [facility]. She is a full code, daughter Merlyn Ahn is surrogate decision maker.      Past Medical History:   Diagnosis Date    Arthritis     CHF (congestive heart failure)     Diabetes     Hypertension     Renal arteriosclerosis        Past Surgical History:   Procedure Laterality Date    BYPASS GRAFT            GALLBLADDER SURGERY      HYSTERECTOMY         Review of patient's allergies indicates:  No Known Allergies    No current facility-administered medications on file prior to encounter.      Current Outpatient Medications on File Prior to Encounter   Medication Sig    ascorbic acid, vitamin C, (VITAMIN C) 500 MG tablet Take 500 mg by mouth once daily.    aspirin 81 MG Chew Take 81 mg by mouth once daily.     atorvastatin (LIPITOR) 40 MG tablet Take 40 mg by mouth once daily.    calcium carbonate (OS-ULICES) 500 mg calcium (1,250 mg) tablet Take 1 tablet by mouth 2 (two) times daily.    carvedilol (COREG) 25 MG tablet Take 25 mg by mouth 2 (two) times daily with meals.     econazole nitrate 1 % cream Apply 1 Doses/Fill topically as needed.    fluoxetine HCl (PROZAC ORAL) Take 30 mg by mouth once daily.     furosemide (LASIX) 40 MG tablet Take 40 mg by mouth 2 (two) times daily.    glucosamine sulfate 500 mg Tab Take 1 capsule by mouth 2 (two) times daily.    hydrALAZINE (APRESOLINE) 100 MG tablet Take 100 mg by mouth 2 (two) times daily.    insulin aspart U-100 (NOVOLOG) 100 unit/mL injection Inject 10 Units into the skin 3 (three) times daily before meals. Not taking    insulin detemir U-100 (LEVEMIR) 100 unit/mL injection Inject 25 Units into the skin once daily. Pt. Taking 18 units    isosorbide mononitrate (IMDUR) 30 MG 24 hr tablet Take 30 mg by mouth 2 (two) times daily.    losartan (COZAAR) 50 MG tablet Take 50 mg by mouth 2 (two) times daily.     magnesium oxide (MAG-OX) 400 mg (241.3 mg magnesium) tablet Take 400 mg by mouth 2 (two) times daily.    metoprolol tartrate (LOPRESSOR) 50 MG tablet Take 50 mg by mouth once daily.    NIFEdipine (PROCARDIA XL) 60 MG (OSM) 24 hr tablet Take 60 mg by mouth 2 (two) times daily.    vitamin D (VITAMIN D3) 1000 units Tab Take 1,000 Units by mouth every evening.    nitroGLYCERIN (NITROSTAT) 0.4 MG SL tablet Take 0.4 mg by mouth once daily.     Family History     Problem Relation (Age of Onset)    Cancer Mother, Father        Tobacco Use    Smoking status: Never Smoker    Smokeless tobacco: Never Used   Substance and Sexual Activity    Alcohol use: Not Currently     Frequency: Never    Drug use: Never    Sexual activity: Not on file     Review of Systems   Constitutional: Positive for activity change and appetite change. Negative for chills and fever.   HENT: Negative for congestion, rhinorrhea and sinus pressure.    Respiratory: Positive for shortness of breath. Negative for apnea, cough, choking, chest tightness, wheezing and stridor.    Cardiovascular: Negative for chest pain, palpitations and leg swelling.    Gastrointestinal: Negative for abdominal distention, abdominal pain, diarrhea, nausea and vomiting.   Endocrine: Negative for cold intolerance and heat intolerance.   Genitourinary: Negative for difficulty urinating and hematuria.   Musculoskeletal: Negative for arthralgias and joint swelling.   Skin: Negative for color change, pallor and rash.   Neurological: Positive for weakness. Negative for dizziness, seizures, numbness and headaches.   Psychiatric/Behavioral: Negative for agitation. The patient is not nervous/anxious.      Objective:     Vital Signs (Most Recent):  Temp: 98 °F (36.7 °C) (08/22/19 1645)  Pulse: 62 (08/22/19 1745)  Resp: (!) 21 (08/22/19 1745)  BP: (!) 163/29 (08/22/19 1745)  SpO2: 95 % (08/22/19 1745) Vital Signs (24h Range):  Temp:  [98 °F (36.7 °C)-99.1 °F (37.3 °C)] 98 °F (36.7 °C)  Pulse:  [52-66] 62  Resp:  [17-28] 21  SpO2:  [82 %-100 %] 95 %  BP: ()/(29-92) 163/29     Weight: 57.2 kg (126 lb 1.7 oz)  Body mass index is 26.36 kg/m².    Physical Exam   Constitutional: She is oriented to person, place, and time. No distress.   HENT:   Mouth/Throat: No oropharyngeal exudate.   Eyes: Right eye exhibits no discharge. Left eye exhibits no discharge.   Neck: No JVD present.   Cardiovascular: Bradycardia present. Exam reveals no gallop and no friction rub.   No murmur heard.  Pulmonary/Chest: No stridor. No respiratory distress. She has decreased breath sounds in the left lower field. She has no wheezes. She has no rales. She exhibits no tenderness.   Abdominal: She exhibits no distension and no mass. There is no tenderness. There is no guarding. No hernia.   Musculoskeletal: She exhibits no edema or deformity.   Neurological: She is alert and oriented to person, place, and time.   Skin: She is not diaphoretic.   Nursing note and vitals reviewed.          Significant Labs:   CBC:   Recent Labs   Lab 08/22/19  1500   WBC 10.49   HGB 10.8*   HCT 33.7*   *     CMP:   Recent Labs   Lab  08/22/19  1500      K 3.8   CL 99   CO2 29      BUN 38*   CREATININE 1.2   CALCIUM 9.7   PROT 7.2   ALBUMIN 3.4*   BILITOT 0.5   ALKPHOS 92   AST 19   ALT 27   ANIONGAP 13   EGFRNONAA 45*     Cardiac Markers:   Recent Labs   Lab 08/22/19  1500   *                   Assessment/Plan:     * Acute on chronic respiratory failure with hypoxia  Admit to ICU   Pulmonary followin   Bipap wean as tolerated   Lasix IV X 1 today   Continue Lasix PO tomorrow       Bilateral pleural effusion  S/P Thoracentesis today with 800 ml removed   Continue Lasix           Coronary artery disease involving native coronary artery of native heart without angina pectoris  Continue ASA, Imdur, Statin, and ARB   Hold B blocker for now due to Bradycardia   Telemetry monitor     Bradycardia, sinus asymptomatic  Hold B blocker for now       Other hyperlipidemia  Continue Statin       Essential hypertension    Continue hydralazine, Imdur and losartan      Type 2 diabetes mellitus with hyperglycemia, with long-term current use of insulin  ACCU ACHS with SSI   Diabetic diet           VTE Risk Mitigation (From admission, onward)        Ordered     enoxaparin injection 40 mg  Daily      08/22/19 1708        Critical care time spent on the evaluation and treatment of severe organ dysfunction, review of pertinent labs and imaging studies, discussions with consulting providers and discussions with patient/family: 45 minutes.     Wallace Snow NP  Department of Hospital Medicine   [facility]

## 2019-08-22 NOTE — OP NOTE
Called for patient coughing , low O2 sat , CXR no PTX, Duoneb ordered . Patient stabilized , 15 lpm O2 O2 sat was 90-95%. Needed BIPAP for mild distress.

## 2019-08-22 NOTE — CONSULTS
[facility]   Critical Care Medicine  Consult Note    Patient Name: Mercy Woodruff  MRN: [id number]  Admission Date: 8/22/2019  Hospital Length of Stay: 0 days  Code Status: Full Code  Attending Physician: Rashaun Briscoe MD   Primary Care Provider: Manda Almendarez MD   Principal Problem: Acute on chronic respiratory failure with hypoxia      Subjective:     HPI:  Ms Woodruff is a 75 yo elderly WF with a PMH of DM, HTN, CHF, CAD and Arthritis.  She was seen 8/19 by Dr. Galloway in pulm clinic for evaluation of chronic hypoxemic respiratory failure.  Patient known with CAD status post CABG about 8 years ago, late 2018 she underwent cardiac catheterization, no stents were placed.  Her cardiologist is Dr.Charles Laguerre with [facility].  She has been admitted twice with CHF exacerbation June 2019 and August 2019.  She is currently wearing oxygen via NC O2 24 hr.  On 8/19 she had CXR and chest US with US revealing Large right simple appearing pleural effusion measuring approximately 691 mL and a Moderate-large size simple appearing left pleural effusion measuring approximately 327 mL.  She had an elective right thoracentesis today as outpatient here at [facility] and 800 ml of clear pleural fluid was obtained. A sample was sent to Pathology and cell counts, as well as for infection analysis.  Post procedure Dr. Galloway was called for patient coughing , low O2 sat , CXR no PTX, Duoneb ordered . Patient stabilized , 15 lpm O2 sat was 90-95%. Needed BIPAP for mild distress and admitted to ICU.      Hospital/ICU Course:  8/22 - Upon arrival to ICU VSS except mild bradycardia in 50s but fully awake and alert on NPPV via BiPAP in no distress.  BiPAP removed and patient placed on NC O2 and tolerating well.     Past Medical History:   Diagnosis Date    Arthritis     CHF (congestive heart failure)     Diabetes     Hypertension     Renal arteriosclerosis        Past Surgical History:   Procedure  Laterality Date    BYPASS GRAFT            GALLBLADDER SURGERY      HYSTERECTOMY         Review of patient's allergies indicates:  No Known Allergies    Family History     Problem Relation (Age of Onset)    Cancer Mother, Father        Tobacco Use    Smoking status: Never Smoker    Smokeless tobacco: Never Used   Substance and Sexual Activity    Alcohol use: Not Currently     Frequency: Never    Drug use: Never    Sexual activity: Not on file         Review of Systems   Constitutional: Positive for fatigue. Negative for appetite change, chills, diaphoresis and fever.   HENT: Negative for congestion.    Respiratory: Positive for cough. Negative for apnea, shortness of breath and wheezing.    Cardiovascular: Negative for chest pain and leg swelling.   Gastrointestinal: Negative for abdominal pain, diarrhea, nausea and vomiting.   Endocrine: Negative for polydipsia.   Genitourinary: Negative for difficulty urinating.   Musculoskeletal: Negative for myalgias.   Skin: Negative for color change and wound.   Allergic/Immunologic: Negative for immunocompromised state.   Neurological: Negative for dizziness, syncope and weakness.   Hematological: Does not bruise/bleed easily.   Psychiatric/Behavioral: Negative for agitation, confusion and hallucinations. The patient is not nervous/anxious.      Objective:     Vital Signs (Most Recent):  Temp: 99 °F (37.2 °C) (19 1253)  Pulse: 60 (19 1714)  Resp: 20 (19)  BP: (!) 129/45 (19 1523)  SpO2: (!) 94 % (19) Vital Signs (24h Range):  Temp:  [99 °F (37.2 °C)-99.1 °F (37.3 °C)] 99 °F (37.2 °C)  Pulse:  [52-60] 60  Resp:  [17-28] 20  SpO2:  [82 %-99 %] 94 %  BP: ()/(42-92) 129/45     Weight: 57.2 kg (126 lb 1.7 oz)  Body mass index is 26.36 kg/m².      Intake/Output Summary (Last 24 hours) at 2019 1720  Last data filed at 2019 1300  Gross per 24 hour   Intake --   Output 800 ml   Net -800 ml       Physical Exam    Constitutional: She is oriented to person, place, and time. She appears well-developed and well-nourished. She is cooperative.  Non-toxic appearance. She does not have a sickly appearance. She does not appear ill. No distress. She is not intubated. Nasal cannula in place.   HENT:   Head: Normocephalic and atraumatic.   Mouth/Throat: Oropharynx is clear and moist and mucous membranes are normal.   Eyes: Pupils are equal, round, and reactive to light. EOM and lids are normal.   Neck: Trachea normal and full passive range of motion without pain. Carotid bruit is not present.   Cardiovascular: Regular rhythm and normal heart sounds. Bradycardia present.   Pulses:       Radial pulses are 2+ on the right side, and 2+ on the left side.        Dorsalis pedis pulses are 1+ on the right side, and 1+ on the left side.   Pulmonary/Chest: Effort normal and breath sounds normal. No accessory muscle usage. No tachypnea. She is not intubated. No respiratory distress.   Abdominal: Soft. Normal appearance. She exhibits no distension. Bowel sounds are decreased. There is no tenderness.   Musculoskeletal: Normal range of motion.        Right foot: There is no deformity.        Left foot: There is no deformity.   No edema   Lymphadenopathy:     She has no cervical adenopathy.   Neurological: She is alert and oriented to person, place, and time.   Skin: Skin is warm, dry and intact. Capillary refill takes less than 2 seconds. No rash noted. No cyanosis.   Psychiatric: She has a normal mood and affect. Her speech is normal and behavior is normal. Judgment and thought content normal. Cognition and memory are normal.       Vents:  Oxygen Concentration (%): 30 (08/22/19 1714)    Lines/Drains/Airways     Peripheral Intravenous Line                 Peripheral IV - Single Lumen 08/22/19 1135 20 G Right Antecubital less than 1 day                Significant Labs:    CBC/Anemia Profile:  Recent Labs   Lab 08/22/19  1500   WBC 10.49   HGB 10.8*    HCT 33.7*   *   MCV 94   RDW 15.2*        Chemistries:  Recent Labs   Lab 08/22/19  1500      K 3.8   CL 99   CO2 29   BUN 38*   CREATININE 1.2   CALCIUM 9.7   ALBUMIN 3.4*   PROT 7.2   BILITOT 0.5   ALKPHOS 92   ALT 27   AST 19       Troponin:   Recent Labs   Lab 08/22/19  1500   TROPONINI <0.006     All pertinent labs within the past 24 hours have been reviewed.    Significant Imaging:   CXR: I have reviewed all pertinent results/findings within the past 24 hours and my personal findings are:  No pneumothorax status post thoracentesis.  Reduction in the degree of congestion and effusions.  Near-total resolution of the right pleural effusion.  No pneumothorax status post thoracentesis.  No change in heart size.    Assessment/Plan:     Pulmonary  * Acute on chronic respiratory failure with hypoxia  BiPAP change to PRN  ICU monitoring overnight  Lasix IVP X 1  NC O2  Repeat CXR in AM    Bilateral pleural effusion  Cont Lasix  S/P 800cc right thoracentesis  Pleural Fluid work up pending  Repeat CXR in AM    Cardiac/Vascular  Other hyperlipidemia  Cont home statin    Coronary artery disease involving native coronary artery of native heart without angina pectoris  Cont ASA, statin, ARB, Imdur  No B-Blocker given bradycardia  ICU cardiac monitoring    Bradycardia, sinus asymptomatic  Holding home B-Blockers  ICU cardiac monitoring    Essential hypertension  Resume home Lasix, Losartan, Imdur and Hydralazine    Endocrine  Type 2 diabetes mellitus with hyperglycemia, with long-term current use of insulin  Add SSI and ADA diet       Preventive Measures and Monitoring:   Stress Ulcer: Pepcid  Nutrition: ADA Cardiac diet  Glucose control: SSi  Bowel prophylaxis: Colace and PRN Dulcolax  DVT prophylaxis: LMWH/SCDs  Hx CAD on B-Blocker: none due to bradycardia  Head of Bed/Reposition: Elevate HOB and turn Q1-2 hours   Early Mobility: bed rest tonight  Code Status: Full  Pneumonia Vaccine:  UTD    Counseling/Consultation:I have discussed the care of this patient in detail with the bedside nursing staff and Dr. Ziegler and Dr. Briscoe    Critical Care Time: 48 minutes  Critical secondary to Patient has a condition that poses threat to life and bodily function: Acute on Chronic Hypoxia Resp Failure     Critical care was time spent personally by me on the following activities: development of treatment plan with patient or surrogate and bedside caregivers, discussions with consultants, evaluation of patient's response to treatment, examination of patient, ordering and performing treatments and interventions, ordering and review of laboratory studies, ordering and review of radiographic studies, pulse oximetry, re-evaluation of patient's condition. This critical care time did not overlap with that of any other provider or involve time for any procedures.    Thank you for your consult. I will follow-up with patient. Please contact us if you have any additional questions.     Lisandro Gabriel NP  Critical Care Medicine  [facility]

## 2019-08-22 NOTE — SUBJECTIVE & OBJECTIVE
Past Medical History:   Diagnosis Date    Arthritis     CHF (congestive heart failure)     Diabetes     Hypertension     Renal arteriosclerosis        Past Surgical History:   Procedure Laterality Date    BYPASS GRAFT            GALLBLADDER SURGERY      HYSTERECTOMY         Review of patient's allergies indicates:  No Known Allergies    No current facility-administered medications on file prior to encounter.      Current Outpatient Medications on File Prior to Encounter   Medication Sig    ascorbic acid, vitamin C, (VITAMIN C) 500 MG tablet Take 500 mg by mouth once daily.    aspirin 81 MG Chew Take 81 mg by mouth once daily.     atorvastatin (LIPITOR) 40 MG tablet Take 40 mg by mouth once daily.    calcium carbonate (OS-ULICES) 500 mg calcium (1,250 mg) tablet Take 1 tablet by mouth 2 (two) times daily.    carvedilol (COREG) 25 MG tablet Take 25 mg by mouth 2 (two) times daily with meals.    econazole nitrate 1 % cream Apply 1 Doses/Fill topically as needed.    fluoxetine HCl (PROZAC ORAL) Take 30 mg by mouth once daily.     furosemide (LASIX) 40 MG tablet Take 40 mg by mouth 2 (two) times daily.    glucosamine sulfate 500 mg Tab Take 1 capsule by mouth 2 (two) times daily.    hydrALAZINE (APRESOLINE) 100 MG tablet Take 100 mg by mouth 2 (two) times daily.    insulin aspart U-100 (NOVOLOG) 100 unit/mL injection Inject 10 Units into the skin 3 (three) times daily before meals. Not taking    insulin detemir U-100 (LEVEMIR) 100 unit/mL injection Inject 25 Units into the skin once daily. Pt. Taking 18 units    isosorbide mononitrate (IMDUR) 30 MG 24 hr tablet Take 30 mg by mouth 2 (two) times daily.    losartan (COZAAR) 50 MG tablet Take 50 mg by mouth 2 (two) times daily.     magnesium oxide (MAG-OX) 400 mg (241.3 mg magnesium) tablet Take 400 mg by mouth 2 (two) times daily.    metoprolol tartrate (LOPRESSOR) 50 MG tablet Take 50 mg by mouth once daily.    NIFEdipine (PROCARDIA XL) 60  MG (OSM) 24 hr tablet Take 60 mg by mouth 2 (two) times daily.    vitamin D (VITAMIN D3) 1000 units Tab Take 1,000 Units by mouth every evening.    nitroGLYCERIN (NITROSTAT) 0.4 MG SL tablet Take 0.4 mg by mouth once daily.     Family History     Problem Relation (Age of Onset)    Cancer Mother, Father        Tobacco Use    Smoking status: Never Smoker    Smokeless tobacco: Never Used   Substance and Sexual Activity    Alcohol use: Not Currently     Frequency: Never    Drug use: Never    Sexual activity: Not on file     Review of Systems   Constitutional: Positive for activity change and appetite change. Negative for chills and fever.   HENT: Negative for congestion, rhinorrhea and sinus pressure.    Respiratory: Positive for shortness of breath. Negative for apnea, cough, choking, chest tightness, wheezing and stridor.    Cardiovascular: Negative for chest pain, palpitations and leg swelling.   Gastrointestinal: Negative for abdominal distention, abdominal pain, diarrhea, nausea and vomiting.   Endocrine: Negative for cold intolerance and heat intolerance.   Genitourinary: Negative for difficulty urinating and hematuria.   Musculoskeletal: Negative for arthralgias and joint swelling.   Skin: Negative for color change, pallor and rash.   Neurological: Positive for weakness. Negative for dizziness, seizures, numbness and headaches.   Psychiatric/Behavioral: Negative for agitation. The patient is not nervous/anxious.      Objective:     Vital Signs (Most Recent):  Temp: 98 °F (36.7 °C) (08/22/19 1645)  Pulse: 62 (08/22/19 1745)  Resp: (!) 21 (08/22/19 1745)  BP: (!) 163/29 (08/22/19 1745)  SpO2: 95 % (08/22/19 1745) Vital Signs (24h Range):  Temp:  [98 °F (36.7 °C)-99.1 °F (37.3 °C)] 98 °F (36.7 °C)  Pulse:  [52-66] 62  Resp:  [17-28] 21  SpO2:  [82 %-100 %] 95 %  BP: ()/(29-92) 163/29     Weight: 57.2 kg (126 lb 1.7 oz)  Body mass index is 26.36 kg/m².    Physical Exam   Constitutional: She is oriented  to person, place, and time. No distress.   HENT:   Mouth/Throat: No oropharyngeal exudate.   Eyes: Right eye exhibits no discharge. Left eye exhibits no discharge.   Neck: No JVD present.   Cardiovascular: Bradycardia present. Exam reveals no gallop and no friction rub.   No murmur heard.  Pulmonary/Chest: No stridor. No respiratory distress. She has decreased breath sounds in the left lower field. She has no wheezes. She has no rales. She exhibits no tenderness.   Abdominal: She exhibits no distension and no mass. There is no tenderness. There is no guarding. No hernia.   Musculoskeletal: She exhibits no edema or deformity.   Neurological: She is alert and oriented to person, place, and time.   Skin: She is not diaphoretic.   Nursing note and vitals reviewed.          Significant Labs:   CBC:   Recent Labs   Lab 08/22/19  1500   WBC 10.49   HGB 10.8*   HCT 33.7*   *     CMP:   Recent Labs   Lab 08/22/19  1500      K 3.8   CL 99   CO2 29      BUN 38*   CREATININE 1.2   CALCIUM 9.7   PROT 7.2   ALBUMIN 3.4*   BILITOT 0.5   ALKPHOS 92   AST 19   ALT 27   ANIONGAP 13   EGFRNONAA 45*     Cardiac Markers:   Recent Labs   Lab 08/22/19  1500   *

## 2019-08-22 NOTE — HOSPITAL COURSE
8/22 - Upon arrival to ICU VSS except mild bradycardia in 50s but fully awake and alert on NPPV via BiPAP in no distress.  BiPAP removed and patient placed on NC O2 and tolerating well.   8/23 - Off NPPV since admit to ICU yesterday.  Denies pain or SOB.  VSS and no distress

## 2019-08-22 NOTE — PLAN OF CARE
Notified  Dr. Landry of patient's persistent coughing and c/o shortness of breath.  Dr. Landry came back to bedside.

## 2019-08-22 NOTE — ASSESSMENT & PLAN NOTE
Admit to ICU   Pulmonary followin   Bipap wean as tolerated   Lasix IV X 1 today   Continue Lasix PO tomorrow

## 2019-08-22 NOTE — OR NURSING
800 ml clear yellow fluid removed via Thoracentesis by Dr. Landry. Patient tolerated procedure well

## 2019-08-22 NOTE — DISCHARGE SUMMARY
[facility] -   Pulmonology  Discharge Summary      Patient Name: Mercy Woodruff  MRN: [id number]  Admission Date: 8/22/2019  Hospital Length of Stay: 0 days  Discharge Date and Time:  08/22/2019 12:51 PM  Attending Physician: John Landry MD   Discharging Provider: John Landry MD  Primary Care Provider: Manda Almendarez MD    HPI:  Pleural effusion , CHF , sp right thoracentesis w US   Procedure(s) (LRB):  Thoracentesis (Right)    Indwelling Lines/Drains at Time of Discharge:   Lines/Drains/Airways          None          Hospital Course:     Pleural effusion , CHF , sp right thoracentesis w US       Significant Imaging:    Bilateral pleural effusion Rt > left sp 800 ml serous fluid     Pending Diagnostic Studies:     Procedure Component Value Units Date/Time    US Chest Mediastinum [[id number]] Resulted:  08/22/19 1225    Order Status:  Sent Lab Status:  In process Updated:  08/22/19 1225        Final Active Diagnoses:    Diagnosis Date Noted POA    Bilateral pleural effusion [J90] 08/22/2019 Yes      Problems Resolved During this Admission:       Discharged Condition: good    Disposition:   Home   Follow Up:  CXR in 2 weeks   Patient Instructions:   No discharge procedures on file.  Medications:  Reconciled Home Medications:      Medication List      ASK your doctor about these medications    aspirin 325 MG tablet  Take 325 mg by mouth once daily.     atorvastatin 40 MG tablet  Commonly known as:  LIPITOR  Take 40 mg by mouth once daily.     econazole nitrate 1 % cream  Apply 1 Doses/Fill topically as needed.     furosemide 40 MG tablet  Commonly known as:  LASIX  Take 40 mg by mouth 2 (two) times daily.     isosorbide mononitrate 30 MG 24 hr tablet  Commonly known as:  IMDUR  Take 30 mg by mouth 2 (two) times daily.     metoprolol tartrate 50 MG tablet  Commonly known as:  LOPRESSOR  Take 50 mg by mouth once daily.     nitroGLYCERIN 0.4 MG SL tablet  Commonly known as:  NITROSTAT  Take  0.4 mg by mouth once daily.            John Landry MD  Pulmonology  [facility]

## 2019-08-22 NOTE — HPI
Ms Woodruff is a 75 yo elderly WF with a PMH of DM, HTN, CHF, CAD and Arthritis.  She was seen 8/19 by Dr. Galloway in pulm clinic for evaluation of chronic hypoxemic respiratory failure.  Patient known with CAD status post CABG about 8 years ago, late 2018 she underwent cardiac catheterization, no stents were placed.  Her cardiologist is Dr.Charles Laguerre with [facility].  She has been admitted twice with CHF exacerbation June 2019 and August 2019.  She is currently wearing oxygen via NC O2 24 hr.  On 8/19 she had CXR and chest US with US revealing Large right simple appearing pleural effusion measuring approximately 691 mL and a Moderate-large size simple appearing left pleural effusion measuring approximately 327 mL.  She had an elective right thoracentesis today as outpatient here at [facility] and 800 ml of clear pleural fluid was obtained. A sample was sent to Pathology and cell counts, as well as for infection analysis.  Post procedure Dr. Galloway was called for patient coughing , low O2 sat , CXR no PTX, Duoneb ordered . Patient stabilized , 15 lpm O2 sat was 90-95%. Needed BIPAP for mild distress and admitted to ICU.

## 2019-08-22 NOTE — BRIEF OP NOTE
[facility]  Thoracentesis  Procedure Note    SUMMARY     Date of Procedure: 08/22/2019     Procedure: Thoracentesis    Indications: Therapeutic    Pre-Operative Diagnosis:  Right side effusion     Post-Operative Diagnosis: same    Anesthesia: local    Technical Procedures Used: US guided over the needle cath thoracentesis     Description of the Findings of the Procedure: 700 ml right side effsuion , about 400 ml left side     Consent: Informed consent was obtained. Risks of the procedure were discussed including: infection, bleeding, pain, pneumothorax.    Under sterile conditions the patient was positioned. Chloraprep  solution and sterile drapes were utilized. 5 cc 1% plain lidocaine was used to anesthetize between the rib space after localized under ultrasound. Fluid was obtained after catheter inserted without  difficulty and suction applied with minimal blood loss.  A dressing was applied to the wound and wound care instructions were provided.     800 ml of clear pleural fluid was obtained. A sample was sent to Pathology and cell counts, as well as for infection analysis.    Plan:    A follow up chest x-ray was ordered. Yes  Tylenol 650 mg. for pain.    Significant Surgical Tasks Conducted by the Assistant(s), if Applicable:    Complications: acute on chronic hypoxemia and resp distress, stable on BIPAP. Will admit patient for acute on chronciresp failure / possible re expansion pulm edema, no PTX noted on CXR post thoracentesis on the right .      Estimated Blood Loss (EBL): Minimal    Attestation: I performed the procedure.     John Landry M.D

## 2019-08-23 VITALS
HEIGHT: 58 IN | SYSTOLIC BLOOD PRESSURE: 169 MMHG | DIASTOLIC BLOOD PRESSURE: 44 MMHG | OXYGEN SATURATION: 99 % | WEIGHT: 126.13 LBS | TEMPERATURE: 98 F | HEART RATE: 57 BPM | RESPIRATION RATE: 18 BRPM | BODY MASS INDEX: 26.48 KG/M2

## 2019-08-23 PROBLEM — J90 BILATERAL PLEURAL EFFUSION: Chronic | Status: RESOLVED | Noted: 2019-08-22 | Resolved: 2019-08-23

## 2019-08-23 PROBLEM — J96.21 ACUTE ON CHRONIC RESPIRATORY FAILURE WITH HYPOXIA: Status: RESOLVED | Noted: 2019-08-22 | Resolved: 2019-08-23

## 2019-08-23 LAB
ANION GAP SERPL CALC-SCNC: 9 MMOL/L (ref 8–16)
BASOPHILS # BLD AUTO: 0.05 K/UL (ref 0–0.2)
BASOPHILS NFR BLD: 0.5 % (ref 0–1.9)
BUN SERPL-MCNC: 31 MG/DL (ref 8–23)
CALCIUM SERPL-MCNC: 9 MG/DL (ref 8.7–10.5)
CHLORIDE SERPL-SCNC: 99 MMOL/L (ref 95–110)
CO2 SERPL-SCNC: 33 MMOL/L (ref 23–29)
CREAT SERPL-MCNC: 1 MG/DL (ref 0.5–1.4)
DIFFERENTIAL METHOD: ABNORMAL
EOSINOPHIL # BLD AUTO: 0.3 K/UL (ref 0–0.5)
EOSINOPHIL NFR BLD: 3.3 % (ref 0–8)
ERYTHROCYTE [DISTWIDTH] IN BLOOD BY AUTOMATED COUNT: 15.2 % (ref 11.5–14.5)
EST. GFR  (AFRICAN AMERICAN): >60 ML/MIN/1.73 M^2
EST. GFR  (NON AFRICAN AMERICAN): 56 ML/MIN/1.73 M^2
GLUCOSE SERPL-MCNC: 68 MG/DL (ref 70–110)
HCT VFR BLD AUTO: 28.1 % (ref 37–48.5)
HGB BLD-MCNC: 9.1 G/DL (ref 12–16)
LYMPHOCYTES # BLD AUTO: 0.9 K/UL (ref 1–4.8)
LYMPHOCYTES NFR BLD: 8.8 % (ref 18–48)
MAGNESIUM SERPL-MCNC: 1.8 MG/DL (ref 1.6–2.6)
MCH RBC QN AUTO: 31 PG (ref 27–31)
MCHC RBC AUTO-ENTMCNC: 32.4 G/DL (ref 32–36)
MCV RBC AUTO: 96 FL (ref 82–98)
MONOCYTES # BLD AUTO: 0.8 K/UL (ref 0.3–1)
MONOCYTES NFR BLD: 7.9 % (ref 4–15)
NEUTROPHILS # BLD AUTO: 8.1 K/UL (ref 1.8–7.7)
NEUTROPHILS NFR BLD: 79.7 % (ref 38–73)
PATH INTERP FLD-IMP: NORMAL
PLATELET # BLD AUTO: 322 K/UL (ref 150–350)
PMV BLD AUTO: 9.7 FL (ref 9.2–12.9)
POCT GLUCOSE: 106 MG/DL (ref 70–110)
POCT GLUCOSE: 185 MG/DL (ref 70–110)
POCT GLUCOSE: 74 MG/DL (ref 70–110)
POTASSIUM SERPL-SCNC: 3.3 MMOL/L (ref 3.5–5.1)
RBC # BLD AUTO: 2.94 M/UL (ref 4–5.4)
SODIUM SERPL-SCNC: 141 MMOL/L (ref 136–145)
WBC # BLD AUTO: 10.19 K/UL (ref 3.9–12.7)

## 2019-08-23 PROCEDURE — 36415 COLL VENOUS BLD VENIPUNCTURE: CPT

## 2019-08-23 PROCEDURE — 83735 ASSAY OF MAGNESIUM: CPT

## 2019-08-23 PROCEDURE — 85025 COMPLETE CBC W/AUTO DIFF WBC: CPT

## 2019-08-23 PROCEDURE — 99233 PR SUBSEQUENT HOSPITAL CARE,LEVL III: ICD-10-PCS | Mod: ,,, | Performed by: INTERNAL MEDICINE

## 2019-08-23 PROCEDURE — 99900035 HC TECH TIME PER 15 MIN (STAT)

## 2019-08-23 PROCEDURE — 94640 AIRWAY INHALATION TREATMENT: CPT

## 2019-08-23 PROCEDURE — 25000003 PHARM REV CODE 250: Performed by: NURSE PRACTITIONER

## 2019-08-23 PROCEDURE — 25000242 PHARM REV CODE 250 ALT 637 W/ HCPCS: Performed by: NURSE PRACTITIONER

## 2019-08-23 PROCEDURE — 80048 BASIC METABOLIC PNL TOTAL CA: CPT

## 2019-08-23 PROCEDURE — 99233 SBSQ HOSP IP/OBS HIGH 50: CPT | Mod: ,,, | Performed by: INTERNAL MEDICINE

## 2019-08-23 PROCEDURE — 27000221 HC OXYGEN, UP TO 24 HOURS

## 2019-08-23 RX ORDER — METOLAZONE 2.5 MG/1
2.5 TABLET ORAL EVERY OTHER DAY
Qty: 15 TABLET | Refills: 1 | Status: SHIPPED | OUTPATIENT
Start: 2019-08-23 | End: 2019-08-23 | Stop reason: SDUPTHER

## 2019-08-23 RX ORDER — POTASSIUM CHLORIDE 20 MEQ/1
20 TABLET, EXTENDED RELEASE ORAL
Status: DISCONTINUED | OUTPATIENT
Start: 2019-08-23 | End: 2019-08-23 | Stop reason: HOSPADM

## 2019-08-23 RX ORDER — ASPIRIN 325 MG
325 TABLET ORAL DAILY
Refills: 0 | COMMUNITY
Start: 2019-08-24 | End: 2020-01-08

## 2019-08-23 RX ORDER — LANOLIN ALCOHOL/MO/W.PET/CERES
400 CREAM (GRAM) TOPICAL ONCE
Status: COMPLETED | OUTPATIENT
Start: 2019-08-23 | End: 2019-08-23

## 2019-08-23 RX ORDER — HYDRALAZINE HYDROCHLORIDE 25 MG/1
75 TABLET, FILM COATED ORAL 2 TIMES DAILY
Qty: 180 TABLET | Refills: 11 | Status: SHIPPED | OUTPATIENT
Start: 2019-08-23 | End: 2020-01-08

## 2019-08-23 RX ORDER — FUROSEMIDE 40 MG/1
40 TABLET ORAL 2 TIMES DAILY
Qty: 60 TABLET | Refills: 11 | Status: SHIPPED | OUTPATIENT
Start: 2019-08-23 | End: 2020-02-19

## 2019-08-23 RX ORDER — FAMOTIDINE 20 MG/1
20 TABLET, FILM COATED ORAL DAILY
Qty: 30 TABLET | Refills: 11 | Status: SHIPPED | OUTPATIENT
Start: 2019-08-23 | End: 2020-01-08

## 2019-08-23 RX ORDER — METOLAZONE 2.5 MG/1
2.5 TABLET ORAL EVERY OTHER DAY
Qty: 15 TABLET | Refills: 1 | Status: SHIPPED | OUTPATIENT
Start: 2019-08-23 | End: 2020-01-08

## 2019-08-23 RX ORDER — FAMOTIDINE 20 MG/1
20 TABLET, FILM COATED ORAL DAILY
Status: DISCONTINUED | OUTPATIENT
Start: 2019-08-23 | End: 2019-08-23 | Stop reason: HOSPADM

## 2019-08-23 RX ADMIN — IPRATROPIUM BROMIDE AND ALBUTEROL SULFATE 3 ML: .5; 3 SOLUTION RESPIRATORY (INHALATION) at 07:08

## 2019-08-23 RX ADMIN — ATORVASTATIN CALCIUM 40 MG: 40 TABLET, FILM COATED ORAL at 08:08

## 2019-08-23 RX ADMIN — FUROSEMIDE 40 MG: 40 TABLET ORAL at 08:08

## 2019-08-23 RX ADMIN — ASPIRIN 325 MG ORAL TABLET 325 MG: 325 PILL ORAL at 08:08

## 2019-08-23 RX ADMIN — POTASSIUM CHLORIDE 20 MEQ: 1500 TABLET, EXTENDED RELEASE ORAL at 11:08

## 2019-08-23 RX ADMIN — MAGNESIUM OXIDE TAB 400 MG (241.3 MG ELEMENTAL MG) 400 MG: 400 (241.3 MG) TAB at 08:08

## 2019-08-23 RX ADMIN — POTASSIUM CHLORIDE 20 MEQ: 1500 TABLET, EXTENDED RELEASE ORAL at 09:08

## 2019-08-23 RX ADMIN — DOCUSATE SODIUM 100 MG: 100 CAPSULE, LIQUID FILLED ORAL at 08:08

## 2019-08-23 RX ADMIN — ISOSORBIDE MONONITRATE 30 MG: 30 TABLET, EXTENDED RELEASE ORAL at 08:08

## 2019-08-23 RX ADMIN — LOSARTAN POTASSIUM 50 MG: 50 TABLET, FILM COATED ORAL at 08:08

## 2019-08-23 RX ADMIN — FLUOXETINE 30 MG: 20 CAPSULE ORAL at 09:08

## 2019-08-23 NOTE — DISCHARGE SUMMARY
[facility] Hospital Medicine  Discharge Summary      Patient Name: Mercy Woodruff  MRN: [id number]  Admission Date: 8/22/2019  Hospital Length of Stay: 1 days  Discharge Date and Time:   08/23/2019 10:53 AM  Attending Physician: Rashaun Briscoe MD   Discharging Provider: Rashaun Briscoe MD  Primary Care Provider: Manda Almendarez MD      HPI:   Ms Woodruff is a 74 year old female with PMHx of CAD, HTN, HLD, DM who underwent Rt thoracenthesis on today by Dr Landry. She experienced some shortness of breath post procedure requiring application of BiPap. Patient was admitted to ICU for further evaluation. Troponin negative. , which is down from 939 three days ago. Vital signs and labs stable. Bipap has currently been wean, she it tolerating NC. Patient is being admitted to ICU under the care of [facility]. She is a full code, daughter Merlyn Ahn is surrogate decision maker.      Procedure(s) (LRB):  Thoracentesis (Right)      Hospital Course:   Ms Woodruff is a 74 year old female with PMHx of CAD, HTN, HLD, DM, CHF with Resp failure on Home O2 diagnosed recently with a reported EF of 40%, who underwent Rt thoracenthesis by Dr Landry. Post op, she developed resp distress requiring Bipap and was admitted to ICU for further evaluation. No PTX, Troponin negative. , which is down from 939 three days ago. Vital signs and labs stable. Bipap was later weaned to NC.  She did well over nite and rested comfortably, SOB, orthopnea improved, repeat CXR shows much better aeration but still has mild CHF, no peripheral edema. Pleural fluid is Transudate. She is OOB, sitting in chair, eating drinking well and has been cleared by Dr. Ziegler for discharge. She already has home O2. She was seen and examined and deemed stable for discharge home today. She will follow up with her Cardiologist next week who plans a [facility] next week for the decline in her EF and to r/o CAD. She was already on Lasix 40 TID, so  we added zaroxolyn 2.5 mg QOD and decreased Lasix to 40 BID. She was seen and examined and deemed stable for discharge home today.       Consults:   Consults (From admission, onward)        Status Ordering Provider     Inpatient consult to Internal Medicine  Once     Provider:  NISSA Garduno TAREK M.     Inpatient consult to Pulmonology  Once     Provider:  Lisandro Gabriel NP    Completed JOZEF ALMANZA          No new Assessment & Plan notes have been filed under this hospital service since the last note was generated.  Service: Hospital Medicine    Final Active Diagnoses:    Diagnosis Date Noted POA    Type 2 diabetes mellitus with hyperglycemia, with long-term current use of insulin [E11.65, Z79.4] 08/22/2019 Not Applicable     Chronic    Essential hypertension [I10] 08/22/2019 Yes     Chronic    Other hyperlipidemia [E78.49] 08/22/2019 Yes     Chronic    Bradycardia, sinus asymptomatic [R00.1] 08/22/2019 Yes    Coronary artery disease involving native coronary artery of native heart without angina pectoris [I25.10] 08/22/2019 Yes     Chronic      Problems Resolved During this Admission:    Diagnosis Date Noted Date Resolved POA    PRINCIPAL PROBLEM:  Acute on chronic respiratory failure with hypoxia [J96.21] 08/22/2019 08/23/2019 Yes    Bilateral pleural effusion [J90] 08/22/2019 08/23/2019 Yes     Chronic       Discharged Condition: stable    Disposition: Home or Self Care    Follow Up:  Follow-up Information     John Landry MD On 10/7/2019.    Specialty:  Pulmonary Disease  Contact information:  [address] [place] [address] [address] [place] [place] [address]  [phone]             Manda Almendarez MD. Schedule an appointment as soon as possible for a visit in 1 week.    Specialty:  Family Medicine  Contact information:  [address] [facility] [address]  [phone]             Andrew Laguerre MD. Schedule an appointment as soon as possible for a visit in 3 days.     Specialty:  Cardiology  Why:  Hospital follow up  Contact information:  [address]  [place] [address]  [phone]                 Patient Instructions:      Diet Cardiac     Diet diabetic     Activity as tolerated       Significant Diagnostic Studies: Labs:   BMP:   Recent Labs   Lab 08/22/19  1500 08/23/19  0327    68*    141   K 3.8 3.3*   CL 99 99   CO2 29 33*   BUN 38* 31*   CREATININE 1.2 1.0   CALCIUM 9.7 9.0   MG  --  1.8   , CMP   Recent Labs   Lab 08/22/19  1500 08/23/19  0327    141   K 3.8 3.3*   CL 99 99   CO2 29 33*    68*   BUN 38* 31*   CREATININE 1.2 1.0   CALCIUM 9.7 9.0   PROT 7.2  --    ALBUMIN 3.4*  --    BILITOT 0.5  --    ALKPHOS 92  --    AST 19  --    ALT 27  --    ANIONGAP 13 9   ESTGFRAFRICA 51* >60   EGFRNONAA 45* 56*   , CBC   Recent Labs   Lab 08/22/19  1500 08/23/19  0327   WBC 10.49 10.19   HGB 10.8* 9.1*   HCT 33.7* 28.1*   * 322   All labs within the past 24 hours have been reviewed  Radiology: X-Ray: CXR: X-Ray Chest 1 View (CXR):   Results for orders placed or performed during the hospital encounter of 08/22/19   X-Ray Chest 1 View    Narrative    EXAMINATION:  XR CHEST 1 VIEW    CLINICAL HISTORY:  left pleural effusion;    COMPARISON:  08/22/2019    FINDINGS:  The heart is borderline in size.  Aortic atherosclerosis.  Sternotomy wires.  Haziness right lung base suggest right pleural fluid accumulation.  Atelectatic change left lung base.  No pneumothorax.      Impression    Haziness right lung base consistent with right pleural fluid collection.  No pneumothorax.      Electronically signed by: Lisandro Mcgill  Date:    08/23/2019  Time:    08:22       Pending Diagnostic Studies:     None         Medications:  Reconciled Home Medications:      Medication List      START taking these medications    famotidine 20 MG tablet  Commonly known as:  PEPCID  Take 1 tablet (20 mg total) by mouth once daily.     metOLazone 2.5 MG tablet  Commonly known  as:  ZAROXOLYN  Take 1 tablet (2.5 mg total) by mouth every other day.        CHANGE how you take these medications    * aspirin 81 MG Chew  Take 81 mg by mouth once daily.  What changed:  Another medication with the same name was added. Make sure you understand how and when to take each.     * aspirin 325 MG tablet  Take 1 tablet (325 mg total) by mouth once daily.  Start taking on:  8/24/2019  What changed:  You were already taking a medication with the same name, and this prescription was added. Make sure you understand how and when to take each.     * furosemide 40 MG tablet  Commonly known as:  LASIX  Take 40 mg by mouth 2 (two) times daily.  What changed:  Another medication with the same name was added. Make sure you understand how and when to take each.     * furosemide 40 MG tablet  Commonly known as:  LASIX  Take 1 tablet (40 mg total) by mouth 2 (two) times daily.  What changed:  You were already taking a medication with the same name, and this prescription was added. Make sure you understand how and when to take each.     hydrALAZINE 25 MG tablet  Commonly known as:  APRESOLINE  Take 3 tablets (75 mg total) by mouth 2 (two) times daily.  What changed:    · medication strength  · how much to take         * This list has 4 medication(s) that are the same as other medications prescribed for you. Read the directions carefully, and ask your doctor or other care provider to review them with you.            CONTINUE taking these medications    atorvastatin 40 MG tablet  Commonly known as:  LIPITOR  Take 40 mg by mouth once daily.     calcium carbonate 500 mg calcium (1,250 mg) tablet  Commonly known as:  OS-ULICES  Take 1 tablet by mouth 2 (two) times daily.     carvedilol 25 MG tablet  Commonly known as:  COREG  Take 25 mg by mouth 2 (two) times daily with meals.     econazole nitrate 1 % cream  Apply 1 Doses/Fill topically as needed.     glucosamine sulfate 500 mg Tab  Take 1 capsule by mouth 2 (two) times  daily.     insulin aspart U-100 100 unit/mL injection  Commonly known as:  NOVOLOG  Inject 10 Units into the skin 3 (three) times daily before meals. Not taking     insulin detemir U-100 100 unit/mL injection  Commonly known as:  LEVEMIR  Inject 25 Units into the skin once daily. Pt. Taking 18 units     isosorbide mononitrate 30 MG 24 hr tablet  Commonly known as:  IMDUR  Take 30 mg by mouth 2 (two) times daily.     losartan 50 MG tablet  Commonly known as:  COZAAR  Take 50 mg by mouth 2 (two) times daily.     magnesium oxide 400 mg (241.3 mg magnesium) tablet  Commonly known as:  MAG-OX  Take 400 mg by mouth 2 (two) times daily.     nitroGLYCERIN 0.4 MG SL tablet  Commonly known as:  NITROSTAT  Take 0.4 mg by mouth once daily.     PROCARDIA XL 60 MG (OSM) 24 hr tablet  Generic drug:  NIFEdipine  Take 60 mg by mouth 2 (two) times daily.     PROZAC ORAL  Take 30 mg by mouth once daily.     VITAMIN C 500 MG tablet  Generic drug:  ascorbic acid (vitamin C)  Take 500 mg by mouth once daily.     vitamin D 1000 units Tab  Commonly known as:  VITAMIN D3  Take 1,000 Units by mouth every evening.        STOP taking these medications    metoprolol tartrate 50 MG tablet  Commonly known as:  LOPRESSOR            Indwelling Lines/Drains at time of discharge:   Lines/Drains/Airways          None          Time spent on the discharge of patient: 47 minutes  Patient was seen and examined on the date of discharge and determined to be suitable for discharge.        Rashaun Briscoe MD  Department of Hospital Medicine  [facility]

## 2019-08-23 NOTE — PLAN OF CARE
Patient left before full discharge assessment complete.  Reviewed chart.  Patient was discharged home.  She has home 02.  Merlyn Ahn, daughter is contact at [phone].       08/23/19 1258   Discharge Assessment   Assessment Type Discharge Planning Assessment   Facility Arrived From: home   Equipment Currently Used at Home oxygen   Discharge Plan A Home   DME Needed Upon Discharge  none

## 2019-08-23 NOTE — PLAN OF CARE
Problem: Adult Inpatient Plan of Care  Goal: Plan of Care Review  Outcome: Ongoing (interventions implemented as appropriate)  No acute changes. Patient VSS. Patient denies CP and SOB. Discharge orders received by Dr. Briscoe. Patient discharged from hospital.

## 2019-08-23 NOTE — PROGRESS NOTES
[facility] -   Critical Care Medicine  Progress Note    Patient Name: Mercy Woodruff  MRN: [id number]  Admission Date: 8/22/2019  Hospital Length of Stay: 1 days  Code Status: Full Code  Attending Provider: Rashaun Briscoe MD  Primary Care Provider: Manda Almendarez MD   Principal Problem: Acute on chronic respiratory failure with hypoxia    Subjective:     HPI:  Ms Woodruff is a 75 yo elderly WF with a PMH of DM, HTN, CHF, CAD and Arthritis.  She was seen 8/19 by Dr. Galloway in pulm clinic for evaluation of chronic hypoxemic respiratory failure.  Patient known with CAD status post CABG about 8 years ago, late 2018 she underwent cardiac catheterization, no stents were placed.  Her cardiologist is Dr.Charles Laguerre with [facility].  She has been admitted twice with CHF exacerbation June 2019 and August 2019.  She is currently wearing oxygen via NC O2 24 hr.  On 8/19 she had CXR and chest US with US revealing Large right simple appearing pleural effusion measuring approximately 691 mL and a Moderate-large size simple appearing left pleural effusion measuring approximately 327 mL.  She had an elective right thoracentesis today as outpatient here at [facility] and 800 ml of clear pleural fluid was obtained. A sample was sent to Pathology and cell counts, as well as for infection analysis.  Post procedure Dr. Galloway was called for patient coughing , low O2 sat , CXR no PTX, Duoneb ordered . Patient stabilized , 15 lpm O2 sat was 90-95%. Needed BIPAP for mild distress and admitted to ICU.      Hospital/ICU Course:  8/22 - Upon arrival to ICU VSS except mild bradycardia in 50s but fully awake and alert on NPPV via BiPAP in no distress.  BiPAP removed and patient placed on NC O2 and tolerating well.   8/23 - Off NPPV since admit to ICU yesterday.  Denies pain or SOB.  VSS and no distress    Review of Systems   Constitutional: Positive for malaise/fatigue. Negative for chills and fever.   HENT:  Negative for congestion.    Eyes: Negative for blurred vision.   Respiratory: Negative for cough, sputum production and shortness of breath.    Cardiovascular: Negative for chest pain and leg swelling.   Gastrointestinal: Negative for abdominal pain, nausea and vomiting.   Genitourinary: Negative for dysuria.   Musculoskeletal: Negative for myalgias.   Skin: Negative for rash.   Neurological: Negative for dizziness, weakness and headaches.   Endo/Heme/Allergies: Does not bruise/bleed easily.   Psychiatric/Behavioral: The patient is not nervous/anxious.          Objective:     Vital Signs (Most Recent):  Temp: 98.5 °F (36.9 °C) (08/23/19 0305)  Pulse: 65 (08/23/19 0722)  Resp: (!) 22 (08/23/19 0722)  BP: (!) 166/41 (08/23/19 0605)  SpO2: 97 % (08/23/19 0722) Vital Signs (24h Range):  Temp:  [98 °F (36.7 °C)-99.3 °F (37.4 °C)] 98.5 °F (36.9 °C)  Pulse:  [52-66] 65  Resp:  [13-28] 22  SpO2:  [82 %-100 %] 97 %  BP: ()/(27-92) 166/41     Weight: 57.2 kg (126 lb 1.7 oz)  Body mass index is 26.36 kg/m².      Intake/Output Summary (Last 24 hours) at 8/23/2019 1003  Last data filed at 8/22/2019 2200  Gross per 24 hour   Intake --   Output 1800 ml   Net -1800 ml       Physical Exam   Constitutional: She is oriented to person, place, and time. She appears well-developed and well-nourished. She is cooperative.  Non-toxic appearance. She does not have a sickly appearance. She does not appear ill. No distress. She is not intubated. Nasal cannula in place.   HENT:   Head: Normocephalic and atraumatic.   Mouth/Throat: Oropharynx is clear and moist and mucous membranes are normal.   Eyes: Pupils are equal, round, and reactive to light. EOM and lids are normal.   Neck: Trachea normal and full passive range of motion without pain. Carotid bruit is not present.   Cardiovascular: Regular rhythm and normal heart sounds. Bradycardia present.   Pulses:       Radial pulses are 2+ on the right side, and 2+ on the left side.         Dorsalis pedis pulses are 1+ on the right side, and 1+ on the left side.   Pulmonary/Chest: Effort normal. No accessory muscle usage. No tachypnea. She is not intubated. No respiratory distress. She has decreased breath sounds in the right lower field and the left lower field.   Abdominal: Soft. Normal appearance. She exhibits no distension. Bowel sounds are decreased. There is no tenderness.   Musculoskeletal: Normal range of motion.        Right foot: There is no deformity.        Left foot: There is no deformity.   +1 bilat tibial edema   Lymphadenopathy:     She has no cervical adenopathy.   Neurological: She is alert and oriented to person, place, and time.   Skin: Skin is warm, dry and intact. Capillary refill takes less than 2 seconds. No rash noted. No cyanosis.   Psychiatric: She has a normal mood and affect. Her speech is normal and behavior is normal. Judgment and thought content normal. Cognition and memory are normal.       Vents:  Oxygen Concentration (%): 28 (08/23/19 0722)    Lines/Drains/Airways     Peripheral Intravenous Line                 Peripheral IV - Single Lumen 08/22/19 1135 20 G Right Antecubital less than 1 day                Significant Labs:    CBC/Anemia Profile:  Recent Labs   Lab 08/22/19  1500 08/23/19  0327   WBC 10.49 10.19   HGB 10.8* 9.1*   HCT 33.7* 28.1*   * 322   MCV 94 96   RDW 15.2* 15.2*        Chemistries:  Recent Labs   Lab 08/22/19  1500 08/23/19  0327    141   K 3.8 3.3*   CL 99 99   CO2 29 33*   BUN 38* 31*   CREATININE 1.2 1.0   CALCIUM 9.7 9.0   ALBUMIN 3.4*  --    PROT 7.2  --    BILITOT 0.5  --    ALKPHOS 92  --    ALT 27  --    AST 19  --    MG  --  1.8       POCT Glucose:   Recent Labs   Lab 08/22/19  2117 08/23/19  0559 08/23/19  0623   POCTGLUCOSE 199* 74 106     All pertinent labs within the past 24 hours have been reviewed.    Significant Imaging:  CXR: I have reviewed all pertinent results/findings within the past 24 hours and my personal  findings are:  Haziness right lung base consistent with right pleural fluid collection.  No pneumothorax.      ABG  No results for input(s): PH, PO2, PCO2, HCO3, BE in the last 168 hours.  Assessment/Plan:     Pulmonary  Bilateral pleural effusion  Cont Lasix  S/P 800cc right thoracentesis 8/22  Pleural Fluid work up pending will review results with patient in clinic in follow up with Dr. Galloway  Repeat CXR no thoracentesis but pleural effusion on right returning mildly    Cardiac/Vascular  Other hyperlipidemia  Cont home statin    Coronary artery disease involving native coronary artery of native heart without angina pectoris  Cont ASA, statin, ARB, Imdur  No B-Blocker given bradycardia    Bradycardia, sinus asymptomatic  Holding home B-Blockers    Essential hypertension  Cont home Lasix, Losartan, Imdur and Hydralazine    Endocrine  Type 2 diabetes mellitus with hyperglycemia, with long-term current use of insulin  cont SSI and ADA diet      Clear for discharge home with follow up in pulm clinic with Dr. Galloway.  Will sign off.      Lisandro Gabriel NP  Critical Care Medicine  [facility]

## 2019-08-23 NOTE — SUBJECTIVE & OBJECTIVE
Review of Systems   Constitutional: Positive for malaise/fatigue. Negative for chills and fever.   HENT: Negative for congestion.    Eyes: Negative for blurred vision.   Respiratory: Negative for cough, sputum production and shortness of breath.    Cardiovascular: Negative for chest pain and leg swelling.   Gastrointestinal: Negative for abdominal pain, nausea and vomiting.   Genitourinary: Negative for dysuria.   Musculoskeletal: Negative for myalgias.   Skin: Negative for rash.   Neurological: Negative for dizziness, weakness and headaches.   Endo/Heme/Allergies: Does not bruise/bleed easily.   Psychiatric/Behavioral: The patient is not nervous/anxious.          Objective:     Vital Signs (Most Recent):  Temp: 98.5 °F (36.9 °C) (08/23/19 0305)  Pulse: 65 (08/23/19 0722)  Resp: (!) 22 (08/23/19 0722)  BP: (!) 166/41 (08/23/19 0605)  SpO2: 97 % (08/23/19 0722) Vital Signs (24h Range):  Temp:  [98 °F (36.7 °C)-99.3 °F (37.4 °C)] 98.5 °F (36.9 °C)  Pulse:  [52-66] 65  Resp:  [13-28] 22  SpO2:  [82 %-100 %] 97 %  BP: ()/(27-92) 166/41     Weight: 57.2 kg (126 lb 1.7 oz)  Body mass index is 26.36 kg/m².      Intake/Output Summary (Last 24 hours) at 8/23/2019 1003  Last data filed at 8/22/2019 2200  Gross per 24 hour   Intake --   Output 1800 ml   Net -1800 ml       Physical Exam   Constitutional: She is oriented to person, place, and time. She appears well-developed and well-nourished. She is cooperative.  Non-toxic appearance. She does not have a sickly appearance. She does not appear ill. No distress. She is not intubated. Nasal cannula in place.   HENT:   Head: Normocephalic and atraumatic.   Mouth/Throat: Oropharynx is clear and moist and mucous membranes are normal.   Eyes: Pupils are equal, round, and reactive to light. EOM and lids are normal.   Neck: Trachea normal and full passive range of motion without pain. Carotid bruit is not present.   Cardiovascular: Regular rhythm and normal heart sounds.  Bradycardia present.   Pulses:       Radial pulses are 2+ on the right side, and 2+ on the left side.        Dorsalis pedis pulses are 1+ on the right side, and 1+ on the left side.   Pulmonary/Chest: Effort normal. No accessory muscle usage. No tachypnea. She is not intubated. No respiratory distress. She has decreased breath sounds in the right lower field and the left lower field.   Abdominal: Soft. Normal appearance. She exhibits no distension. Bowel sounds are decreased. There is no tenderness.   Musculoskeletal: Normal range of motion.        Right foot: There is no deformity.        Left foot: There is no deformity.   +1 bilat tibial edema   Lymphadenopathy:     She has no cervical adenopathy.   Neurological: She is alert and oriented to person, place, and time.   Skin: Skin is warm, dry and intact. Capillary refill takes less than 2 seconds. No rash noted. No cyanosis.   Psychiatric: She has a normal mood and affect. Her speech is normal and behavior is normal. Judgment and thought content normal. Cognition and memory are normal.       Vents:  Oxygen Concentration (%): 28 (08/23/19 0722)    Lines/Drains/Airways     Peripheral Intravenous Line                 Peripheral IV - Single Lumen 08/22/19 1135 20 G Right Antecubital less than 1 day                Significant Labs:    CBC/Anemia Profile:  Recent Labs   Lab 08/22/19  1500 08/23/19  0327   WBC 10.49 10.19   HGB 10.8* 9.1*   HCT 33.7* 28.1*   * 322   MCV 94 96   RDW 15.2* 15.2*        Chemistries:  Recent Labs   Lab 08/22/19  1500 08/23/19  0327    141   K 3.8 3.3*   CL 99 99   CO2 29 33*   BUN 38* 31*   CREATININE 1.2 1.0   CALCIUM 9.7 9.0   ALBUMIN 3.4*  --    PROT 7.2  --    BILITOT 0.5  --    ALKPHOS 92  --    ALT 27  --    AST 19  --    MG  --  1.8       POCT Glucose:   Recent Labs   Lab 08/22/19  2117 08/23/19  0559 08/23/19  0623   POCTGLUCOSE 199* 74 106     All pertinent labs within the past 24 hours have been  reviewed.    Significant Imaging:  CXR: I have reviewed all pertinent results/findings within the past 24 hours and my personal findings are:  Haziness right lung base consistent with right pleural fluid collection.  No pneumothorax.

## 2019-08-23 NOTE — HOSPITAL COURSE
Ms Woodruff is a 74 year old female with PMHx of CAD, HTN, HLD, DM, CHF with Resp failure on Home O2 diagnosed recently with a reported EF of 40%, who underwent Rt thoracenthesis by Dr Landry. Post op, she developed resp distress requiring Bipap and was admitted to ICU for further evaluation. No PTX, Troponin negative. , which is down from 939 three days ago. Vital signs and labs stable. Bipap was later weaned to NC.  She did well over nite and rested comfortably, SOB, orthopnea improved, repeat CXR shows much better aeration but still has mild CHF, no peripheral edema. Pleural fluid is Transudate. She is OOB, sitting in chair, eating drinking well and has been cleared by Dr. Ziegler for discharge. She already has home O2. She was seen and examined and deemed stable for discharge home today. She will follow up with her Cardiologist next week who plans a LHC next week for the decline in her EF and to r/o CAD. She was already on Lasix 40 TID, so we added zaroxolyn 2.5 mg QOD and decreased Lasix to 40 BID. She was seen and examined and deemed stable for discharge home today.

## 2019-08-23 NOTE — ASSESSMENT & PLAN NOTE
Cont Lasix  S/P 800cc right thoracentesis 8/22  Pleural Fluid work up pending will review results with patient in clinic in follow up with Dr. Galloway  Repeat CXR no thoracentesis but pleural effusion on right returning mildly

## 2019-08-23 NOTE — PROGRESS NOTES
All discharge teaching performed with pt and pts daughter.  Discharge paperwork given to pt.  Pt ambulated around the unit per dr. Briscoe's orders.  Blood pressure taken.  Diastolic pressure up to the 40s.  Dr. Briscoe states pt can be discharged.  Pt brought down to lobby via wheelchair by AR Rueda RN.  Pt left hospital with her daughter.

## 2019-08-23 NOTE — PLAN OF CARE
Problem: Adult Inpatient Plan of Care  Goal: Plan of Care Review  Outcome: Ongoing (interventions implemented as appropriate)  Pt AAO x 4. VSS, afebrile. Pt on 2L NC, tolerating well. No distress, shortness of breath noted. Thoracentesis site clean/dry/intact. No swelling/drainage noted. Ambulates to restroom with standby assistance. Blood glucose monitored. Pt repositions self independently, reminded to shift weight to prevent skin breakdown. Safety promoted. Instructed to call for assistance. Plan of care reviewed with patient and family.

## 2019-08-24 ENCOUNTER — HOSPITAL ENCOUNTER (OUTPATIENT)
Dept: SLEEP MEDICINE | Facility: HOSPITAL | Age: 74
Discharge: HOME | End: 2019-08-24
Attending: INTERNAL MEDICINE
Payer: MEDICARE

## 2019-08-24 DIAGNOSIS — F51.04 PSYCHOPHYSIOLOGICAL INSOMNIA: ICD-10-CM

## 2019-08-24 DIAGNOSIS — R06.83 SNORING: ICD-10-CM

## 2019-08-24 DIAGNOSIS — F51.5 NIGHTMARES: ICD-10-CM

## 2019-08-24 DIAGNOSIS — G47.19 EXCESSIVE DAYTIME SLEEPINESS: ICD-10-CM

## 2019-08-24 DIAGNOSIS — G47.33 OBSTRUCTIVE SLEEP APNEA: Primary | ICD-10-CM

## 2019-08-24 PROCEDURE — 95811 POLYSOM 6/>YRS CPAP 4/> PARM: CPT | Mod: 26,,, | Performed by: PSYCHOLOGIST

## 2019-08-24 PROCEDURE — 95811 POLYSOM 6/>YRS CPAP 4/> PARM: CPT

## 2019-08-24 PROCEDURE — 95811 PR POLYSOMNOGRAPHY W/CPAP: ICD-10-PCS | Mod: 26,,, | Performed by: PSYCHOLOGIST

## 2019-08-26 NOTE — PHYSICIAN QUERY
"PT Name: Mercy Woodruff  MR #: [id number]    Physician Query Form - Heart  Condition Clarification     CDS/: Nata Sotelo RN CCDS             Contact information:[email or web address]  This form is a permanent document in the medical record.     Query Date: August 26, 2019    By submitting this query, we are merely seeking further clarification of documentation. Please utilize your independent clinical judgment when addressing the question(s) below.    The medical record contains the following   Indicators     Supporting Clinical Findings Location in Medical Record   x BNP ZGC=[id number], 574 Lab 8/19, 8/22   x EF EF 40% DS   x Radiology findings Central pulmonary vascular congestion present with bibasilar opacity.  Pleural effusions noted.  No acute osseous abnormality.  Congestive failure findings. CXR 8/19    Echo Results      "Ascites" documented     x "SOB" or "GARCIA" documented Acute on chronic respiratory failure with hypoxia Discharge summary    "Hypoxia" documented     x Heart Failure documented Ms Woodruff is a 74 year old female with PMHx of CAD, HTN, HLD, DM, CHF with Resp failure on Home O2 diagnosed recently with a reported EF of 40%, who underwent Rt thoracenthesis by Dr Landry. She did well over nite and rested comfortably, SOB, orthopnea improved, repeat CXR shows much better aeration but still has mild CHF, no peripheral edema.   Discharge summary    "Edema" documented     x Diuretics/Meds Lasix 40mg IV x 1  Lasix 40 mg PO BID  8/22 MAR  8/23 MAR   x Treatment: Thoracentesis  700 ml right side effsuion , about 400 ml left side    Op note 8/22    Other:      Heart failure (HF) can be acute, chronic or both. It is generally further specificed as systolic, diastolic, or combined. Lastly, it is important to identify an underlying etiology if known or suspected.     Common clues to acute exacerbation:  Rapidly progressive symptoms (w/in 2 weeks of presentation), using IV diuretics to treat, using " supplemental O2, pulmonary edema on Xray, MI w/in 4 weeks, and/or BNP >500    Systolic Heart Failure: is defined as chart documentation of a left ventricular ejection fraction (LVEF) less than 40%     Diastolic Heart Failure: is defined as a left ventricular ejection fraction (LVEF) greater than 40%   +      Evidence of diastolic dysfunction on echocardiography OR    Right heart catheterization wedge pressure above 12 mm Hg OR    Left heart catheterization left ventricular end diastolic pressure 18 mm Hg or above.    References: *American Heart Association    The clinical guidelines noted below are only system guidelines, and do not replace the providers clinical judgment.     Provider, please specify the diagnosis associated with above clinical findings  [ xx  ] Acute Systolic Heart Failure - New diagnosis.  EF < 40%  and acute HF symptoms documented     [   ] Acute on Chronic Systolic Heart Failure- Pre-existing systolic HF diagnosis.  EF < 40%  and acute HF symptoms documented   [   ] Chronic Systolic Heart Failure - Pre-existing systolic HF diagnosis.  EF < 40%  without  acute HF symptoms documented    [   ] Acute Diastolic Heart Failure - New diagnosis.  EF > 40%  and acute HF symptoms documented   [   ] Acute on Chronic Diastolic Heart Failure -    Pre-existing diastoic HF diagnosis.  EF > 40%  and acute HF symptoms documented       [   ] Chronic Diastolic Heart Failure - Pre-existing diastolic HF diagnosis.  EF > 40%  without  acute HF symptoms documented   [   ] Acute Combined Systolic and Diastolic Heart Failure    [   ] Acute on Chronic Combined Systolic and Diastolic Heart Failure      [   ] Chronic Combined Systolic and Diastolic Heart Failure   [   ] Other Type of Heart Failure (please specify type):   [   ] Heart Failure Ruled Out   [   ] Other (please specify):   [  ] Clinically Undetermined                           Please document in your progress notes daily for the duration of treatment until  resolved and include in your discharge summary.

## 2019-08-26 NOTE — PLAN OF CARE
08/26/19 1727   Final Note   Assessment Type Final Discharge Note   Anticipated Discharge Disposition Home   Right Care Referral Info   Post Acute Recommendation No Care

## 2019-08-27 LAB
BACTERIA FLD AEROBE CULT: NO GROWTH
GRAM STN SPEC: NORMAL
GRAM STN SPEC: NORMAL

## 2019-08-28 ENCOUNTER — PATIENT MESSAGE (OUTPATIENT)
Dept: PULMONOLOGY | Facility: CLINIC | Age: 74
End: 2019-08-28

## 2019-08-28 NOTE — PROCEDURES
Patient Name: Mercy Woodruff   Date of Report: 19    Date of PS2019   [facility] No.: [id number]   : 1945                        Time of PS:51:41 PM - 4:50:57 AM  Sex:  Female   Age:  74   Weight:  128.0 lbs Height:  5  0            Type of PSG:  Split night    REASONS FOR REFERRAL: Ms Woodruff is a 74 year old female, referred for diagnostic polysomnography by Dr. John Landry, based on the patients reported snoring, observed respiratory pauses in sleep, and daytime hypersomnolence.  Her [place] Sleepiness Scale score was 14, clinically significant, and her STOP - BANG score was 5, high risk of DONAL.  Dr. Manda Almendarez is the patients primary care physician.    STUDY PARAMETERS: This study involved analysis of the patient's sleep pattern while breathing unassisted, and, if laboratory  criteria are met, while breathing with assistance from PAP. The study was performed with a sleep technologist in attendance for the entire test period, with video monitoring throughout the study, and routine laboratory clinical parameters recorded:  NOTE: The polysomnography electrophysiological record for the patient has been reviewed in its entirety by Dr. Beavers.    SUMMARY STATEMENTS  DIAGNOSTIC IMPRESSIONS  G47.33  /  327.23  Moderate Obstructive Sleep Apnea, Adult (OSAHS)  F51.04   /  307.42  Psychophysiological Insomnia (stress - related and / or conditioned)    F51.5     /  307.47  Nightmare Disorder    PRIMARY TREATMENT RECOMMENDATIONS  Treat, or refer to [facility] Disorders Center.  1. The diagnostic polysomnography revealed a moderate obstructive sleep apnea / hypopnea syndrome (A + H Index = 23.0 events / hr asleep with 12.0 respiratory event - arousals / hr asleep for the study, and no RERAs (respiratory effort - related arousals). The mean SpO2 value was 88.1 %, significant, minimum oxygen saturation during sleep was 62.0 %, and waking baseline SpO2 was 95 %.    Persistent, moderately loud  snoring was noted.     2. CPAP was initiated at  1:41 am.  The titration polysomnography revealed that none of the 2 pressures that were tested > or = 30 min (10 cm and 16 cm)  was at all effective (A + H Index = 74.2 and 56.2 events / hr asleep,  respectively).  Also, no other pressure was at all effective, and no pressure was tested in REM sleep.  Snoring was soft and sporadic, but was not eliminated at any pressure.  AutoCPAP (4 cm - 20 cm) could be tried if necessary, but also is very likely to be ineffective..    The overall A + H Index was 57.3 / hr asleep, with 40.7 respiratory event - related arousals / hr asleep (and no RERAs) for the titration trial. The mean SpO2 value was 85.8 % throughout the study, significant to severe, with a minimum oxygen saturation during sleep of 75.0 % (waking baseline SpO2 was 94 %).    A small ResMed Air Fit  F30   full -  face  CPAP mask was used and was tolerated, but sleep during CPAP was markedly impaired by spontaneous transient arousals.  Please see PAP trial  outcomes table, below.        3. A  full - night CPAP / BiPAP titration is recommended, but  only  after  successful habituation to CPAP at home (gradually   increasing CPAP pressures are used for increasing amounts of time, initially while awake and occupied, and then while asleep).  CPAP also should be closely monitored (computer card and patient self - report) as no pressure was tested during   REM sleep.    4. The following changes in sleep hygiene / sleep - related behavior are recommended after medical treatments are successful   Limit time for sleep to number of hours of sleep needed for adequate daytime functioning (7.5 to 9.0 hrs / night).    SECONDARY TREATMENT RECOMMENDATIONS  Treat, or refer to [facility] if problems are not satisfactorily resolved by the above.  1. If  insomnia persists after treatments for medical sleep disorders (DONAL) have proven effective, recommend  follow - up inquiry regarding  frequency, duration and nature of reported sleep loss, delayed sleep onset, and poor sleep maintenance (stress - related and / or conditioned psychophysiological insomnia) and referral for behavioral and cognitive / behavioral treatment of insomnia, as indicated.     2. Also consider behavioral and cognitive / behavioral treatments for stress and nightmares; sleep might be expected to further improve.    See below for a complete interpretation of data from the polysomnography and Sleep Disorders Inventory.     Thank you for referring this patient to the [facility].      Baldev Beavers, Ph.D., ABPP; Diplomate, American Board of Sleep Medicine

## 2019-08-31 DIAGNOSIS — G47.33 OSA (OBSTRUCTIVE SLEEP APNEA): Primary | ICD-10-CM

## 2019-09-03 ENCOUNTER — TELEPHONE (OUTPATIENT)
Dept: PULMONOLOGY | Facility: CLINIC | Age: 74
End: 2019-09-03

## 2019-09-03 NOTE — TELEPHONE ENCOUNTER
Called patients daughter. Scheduled walk/pft and follow up appt. Also informed patients daughter of HSAT results. She stated understanding and had no further questions ----- Message from John Landry MD sent at 8/31/2019  5:07 PM CDT -----  Please inform the patient , the sleep study showed that the patient has sleep apnea, the patient quits breathing about 50-60 times per hour.     At night she ll have O2 with CPAP.     CPAP order in . Need to wear CPAP continuously during sleep with a minimum of 4 hours per night , 7 nights out of 10 .     Thank you

## 2019-09-05 ENCOUNTER — CLINICAL SUPPORT (OUTPATIENT)
Dept: PULMONOLOGY | Facility: CLINIC | Age: 74
End: 2019-09-05
Payer: MEDICARE

## 2019-09-05 ENCOUNTER — HOSPITAL ENCOUNTER (OUTPATIENT)
Dept: RADIOLOGY | Facility: HOSPITAL | Age: 74
Discharge: HOME | End: 2019-09-05
Attending: INTERNAL MEDICINE
Payer: MEDICARE

## 2019-09-05 VITALS — WEIGHT: 123 LBS | HEIGHT: 58 IN | BODY MASS INDEX: 25.82 KG/M2

## 2019-09-05 DIAGNOSIS — R06.02 SOB (SHORTNESS OF BREATH): Primary | ICD-10-CM

## 2019-09-05 DIAGNOSIS — R06.02 SOBOE (SHORTNESS OF BREATH ON EXERTION): ICD-10-CM

## 2019-09-05 DIAGNOSIS — R06.02 SOB (SHORTNESS OF BREATH): ICD-10-CM

## 2019-09-05 DIAGNOSIS — J96.11 CHRONIC HYPOXEMIC RESPIRATORY FAILURE: ICD-10-CM

## 2019-09-05 DIAGNOSIS — I50.9 CONGESTIVE HEART FAILURE, UNSPECIFIED HF CHRONICITY, UNSPECIFIED HEART FAILURE TYPE: ICD-10-CM

## 2019-09-05 PROCEDURE — 94729 PR C02/MEMBANE DIFFUSE CAPACITY: ICD-10-PCS | Mod: 26,S$PBB,, | Performed by: INTERNAL MEDICINE

## 2019-09-05 PROCEDURE — 94726 PLETHYSMOGRAPHY LUNG VOLUMES: CPT | Mod: PBBFAC | Performed by: GENERAL PRACTICE

## 2019-09-05 PROCEDURE — 71046 X-RAY EXAM CHEST 2 VIEWS: CPT | Mod: 26,,, | Performed by: RADIOLOGY

## 2019-09-05 PROCEDURE — 94726 PLETHYSMOGRAPHY LUNG VOLUMES: CPT | Mod: 26,S$PBB,, | Performed by: INTERNAL MEDICINE

## 2019-09-05 PROCEDURE — 94729 DIFFUSING CAPACITY: CPT | Mod: 26,S$PBB,, | Performed by: INTERNAL MEDICINE

## 2019-09-05 PROCEDURE — 94726 PULM FUNCT TST PLETHYSMOGRAP: ICD-10-PCS | Mod: 26,S$PBB,, | Performed by: INTERNAL MEDICINE

## 2019-09-05 PROCEDURE — 94010 BREATHING CAPACITY TEST: ICD-10-PCS | Mod: 26,59,S$PBB, | Performed by: INTERNAL MEDICINE

## 2019-09-05 PROCEDURE — 94729 DIFFUSING CAPACITY: CPT | Mod: PBBFAC | Performed by: GENERAL PRACTICE

## 2019-09-05 PROCEDURE — 94618 PULMONARY STRESS TESTING: CPT | Mod: 26,S$PBB,, | Performed by: INTERNAL MEDICINE

## 2019-09-05 PROCEDURE — 94618 PULMONARY STRESS TESTING: ICD-10-PCS | Mod: 26,S$PBB,, | Performed by: INTERNAL MEDICINE

## 2019-09-05 PROCEDURE — 94010 BREATHING CAPACITY TEST: CPT | Mod: 26,59,S$PBB, | Performed by: INTERNAL MEDICINE

## 2019-09-05 PROCEDURE — 94010 BREATHING CAPACITY TEST: CPT | Mod: PBBFAC | Performed by: GENERAL PRACTICE

## 2019-09-05 PROCEDURE — 71046 X-RAY EXAM CHEST 2 VIEWS: CPT | Mod: TC

## 2019-09-05 PROCEDURE — 94618 PULMONARY STRESS TESTING: CPT | Mod: PBBFAC | Performed by: GENERAL PRACTICE

## 2019-09-05 PROCEDURE — 71046 XR CHEST PA AND LATERAL: ICD-10-PCS | Mod: 26,,, | Performed by: RADIOLOGY

## 2019-09-05 NOTE — PROCEDURES
"[facility]  Six Minute Walk     SUMMARY     Ordering Provider: Dr. Landry   Interpreting Provider: Dr. Landry  Performing nurse/tech/RT: FLORA Pretty RRT  Diagnosis: Shortness of Breath(Chronic hypoxemic respiratory failure)  Height: 4' 10" (147.3 cm)  Weight: 55.8 kg (123 lb 0.3 oz)  BMI (Calculated): 25.8   Patient Race:             Phase Oxygen Assessment Supplemental O2 Heart   Rate Blood Pressure [place] Dyspnea Scale Rating   Resting 91 % Room Air 58 bpm 142/42 0   Exercise        Minute        1 91 % Room Air 63 bpm     2 91 % Room Air 66 bpm     3 90 % Room Air 68 bpm     4 93 % Room Air 68 bpm     5 91 % Room Air 70 bpm     6  90 % Room Air 69 bpm 142/46 1   Recovery        Minute        1 92 % Room Air 66 bpm     2 94 % Room Air 61 bpm     3 95 % Room Air 61 bpm     4 97 % Room Air 59 bpm 144/43 1     Six Minute Walk Summary  6MWT Status: completed without stopping  Patient Reported: Leg pain     Interpretation:  Did the patient stop or pause?: No         Total Time Walked (Calculated): 360 seconds  Final Partial Lap Distance (feet): 25 feet  Total Distance Meters (Calculated): 251.46 meters  Predicted Distance Meters (Calculated): 422.3 meters  Percentage of Predicted (Calculated): 59.55  Peak VO2 (Calculated): 11.52  Mets: 3.29  Has The Patient Had a Previous Six Minute Walk Test?: No       Previous 6MWT Results  Has The Patient Had a Previous Six Minute Walk Test?: No    "

## 2019-09-06 ENCOUNTER — LAB VISIT (OUTPATIENT)
Dept: LAB | Facility: HOSPITAL | Age: 74
End: 2019-09-06
Attending: INTERNAL MEDICINE
Payer: MEDICARE

## 2019-09-06 ENCOUNTER — OFFICE VISIT (OUTPATIENT)
Dept: PULMONOLOGY | Facility: CLINIC | Age: 74
End: 2019-09-06
Payer: MEDICARE

## 2019-09-06 VITALS
SYSTOLIC BLOOD PRESSURE: 140 MMHG | BODY MASS INDEX: 25.99 KG/M2 | WEIGHT: 123.81 LBS | HEART RATE: 59 BPM | OXYGEN SATURATION: 95 % | HEIGHT: 58 IN | RESPIRATION RATE: 18 BRPM | DIASTOLIC BLOOD PRESSURE: 44 MMHG

## 2019-09-06 DIAGNOSIS — I50.9 CONGESTIVE HEART FAILURE, UNSPECIFIED HF CHRONICITY, UNSPECIFIED HEART FAILURE TYPE: Primary | ICD-10-CM

## 2019-09-06 DIAGNOSIS — R11.0 NAUSEA: ICD-10-CM

## 2019-09-06 DIAGNOSIS — J98.4 RESTRICTIVE LUNG DISEASE: ICD-10-CM

## 2019-09-06 DIAGNOSIS — G47.33 OSA (OBSTRUCTIVE SLEEP APNEA): ICD-10-CM

## 2019-09-06 DIAGNOSIS — J90 PLEURAL EFFUSION ON RIGHT: ICD-10-CM

## 2019-09-06 DIAGNOSIS — G47.34 NOCTURNAL HYPOXEMIA: ICD-10-CM

## 2019-09-06 LAB
ANION GAP SERPL CALC-SCNC: 10 MMOL/L (ref 8–16)
BUN SERPL-MCNC: 31 MG/DL (ref 8–23)
CALCIUM SERPL-MCNC: 9.8 MG/DL (ref 8.7–10.5)
CHLORIDE SERPL-SCNC: 86 MMOL/L (ref 95–110)
CO2 SERPL-SCNC: 34 MMOL/L (ref 23–29)
CREAT SERPL-MCNC: 1.3 MG/DL (ref 0.5–1.4)
EST. GFR  (AFRICAN AMERICAN): 46.7 ML/MIN/1.73 M^2
EST. GFR  (NON AFRICAN AMERICAN): 40.5 ML/MIN/1.73 M^2
GLUCOSE SERPL-MCNC: 142 MG/DL (ref 70–110)
MAGNESIUM SERPL-MCNC: 2.1 MG/DL (ref 1.6–2.6)
POTASSIUM SERPL-SCNC: 3 MMOL/L (ref 3.5–5.1)
SODIUM SERPL-SCNC: 130 MMOL/L (ref 136–145)

## 2019-09-06 PROCEDURE — 99999 PR PBB SHADOW E&M-EST. PATIENT-LVL III: ICD-10-PCS | Mod: PBBFAC,,, | Performed by: INTERNAL MEDICINE

## 2019-09-06 PROCEDURE — 99999 PR PBB SHADOW E&M-EST. PATIENT-LVL III: CPT | Mod: PBBFAC,,, | Performed by: INTERNAL MEDICINE

## 2019-09-06 PROCEDURE — 36415 COLL VENOUS BLD VENIPUNCTURE: CPT

## 2019-09-06 PROCEDURE — 99215 PR OFFICE/OUTPT VISIT, EST, LEVL V, 40-54 MIN: ICD-10-PCS | Mod: S$PBB,,, | Performed by: INTERNAL MEDICINE

## 2019-09-06 PROCEDURE — 99213 OFFICE O/P EST LOW 20 MIN: CPT | Mod: PBBFAC | Performed by: INTERNAL MEDICINE

## 2019-09-06 PROCEDURE — 83735 ASSAY OF MAGNESIUM: CPT

## 2019-09-06 PROCEDURE — 99215 OFFICE O/P EST HI 40 MIN: CPT | Mod: S$PBB,,, | Performed by: INTERNAL MEDICINE

## 2019-09-06 PROCEDURE — 80048 BASIC METABOLIC PNL TOTAL CA: CPT

## 2019-09-06 RX ORDER — PANTOPRAZOLE SODIUM 40 MG/1
40 TABLET, DELAYED RELEASE ORAL EVERY OTHER DAY
COMMUNITY
Start: 2019-08-29

## 2019-09-06 NOTE — PROGRESS NOTES
Pulmonary Outpatient Follow Up Visit     Subjective:       Patient ID: Mercy Woodruff is a 74 y.o. female.    Chief Complaint: Pleural Effusion      HPI          74-year-old female patient presenting for follow up .  Initially evaluated August 19 2019  for shortness of breath, chest x-ray showed bilateral effusions , August 22 right-sided thoracentesis was performed complicated by re-expansion pulmonary edema, she needed BiPAP and was admitted to the ICU for observation and discharged the next day.    Today she feels well       Patient known with CAD status post CABG about 8 years ago, late 2018 she underwent cardiac catheterization, no stents were placed.  Positive CAD.     Her cardiologist is Dr.Charles Laguerre with [facility].     Review of Systems   Constitutional: Negative for fever and chills.   HENT: Negative for nosebleeds.    Eyes: Negative for redness.   Respiratory: Positive for apnea, snoring, shortness of breath, dyspnea on extertion and somnolence. Negative for choking.    Cardiovascular: Positive for leg swelling.   Genitourinary: Negative for hematuria.   Endocrine: Negative for cold intolerance.    Musculoskeletal: Positive for arthralgias.   Skin: Negative for rash.   Gastrointestinal: Negative for vomiting.   Neurological: Negative for syncope.   Hematological: Negative for adenopathy.   Psychiatric/Behavioral: Positive for sleep disturbance. Negative for confusion.       Outpatient Encounter Medications as of 9/6/2019   Medication Sig Dispense Refill    ascorbic acid, vitamin C, (VITAMIN C) 500 MG tablet Take 500 mg by mouth once daily.      aspirin 325 MG tablet Take 1 tablet (325 mg total) by mouth once daily.  0    aspirin 81 MG Chew Take 81 mg by mouth once daily.       atorvastatin (LIPITOR) 40 MG tablet Take 40 mg by mouth once daily.      calcium carbonate (OS-ULICES) 500 mg calcium (1,250 mg) tablet Take 1 tablet by mouth 2 (two) times daily.    "   carvedilol (COREG) 25 MG tablet Take 25 mg by mouth 2 (two) times daily with meals.      econazole nitrate 1 % cream Apply 1 Doses/Fill topically as needed.      famotidine (PEPCID) 20 MG tablet Take 1 tablet (20 mg total) by mouth once daily. 30 tablet 11    fluoxetine HCl (PROZAC ORAL) Take 30 mg by mouth once daily.       furosemide (LASIX) 40 MG tablet Take 40 mg by mouth 2 (two) times daily.      furosemide (LASIX) 40 MG tablet Take 1 tablet (40 mg total) by mouth 2 (two) times daily. 60 tablet 11    glucosamine sulfate 500 mg Tab Take 1 capsule by mouth 2 (two) times daily.      hydrALAZINE (APRESOLINE) 25 MG tablet Take 3 tablets (75 mg total) by mouth 2 (two) times daily. 180 tablet 11    insulin aspart U-100 (NOVOLOG) 100 unit/mL injection Inject 10 Units into the skin 3 (three) times daily before meals. Not taking      insulin detemir U-100 (LEVEMIR) 100 unit/mL injection Inject 25 Units into the skin once daily. Pt. Taking 18 units      isosorbide mononitrate (IMDUR) 30 MG 24 hr tablet Take 30 mg by mouth 2 (two) times daily.  11    losartan (COZAAR) 50 MG tablet Take 50 mg by mouth 2 (two) times daily.       magnesium oxide (MAG-OX) 400 mg (241.3 mg magnesium) tablet Take 400 mg by mouth 2 (two) times daily.      metOLazone (ZAROXOLYN) 2.5 MG tablet Take 1 tablet (2.5 mg total) by mouth every other day. 15 tablet 1    NIFEdipine (PROCARDIA XL) 60 MG (OSM) 24 hr tablet Take 60 mg by mouth 2 (two) times daily.      nitroGLYCERIN (NITROSTAT) 0.4 MG SL tablet Take 0.4 mg by mouth once daily.  0    vitamin D (VITAMIN D3) 1000 units Tab Take 1,000 Units by mouth every evening.      pantoprazole (PROTONIX) 40 MG tablet Take 40 mg by mouth every other day.       No facility-administered encounter medications on file as of 9/6/2019.        Objective:     Vital Signs (Most Recent)  Vital Signs  Pulse: (!) 59  Resp: 18  SpO2: 95 %  BP: (!) 140/44  Height and Weight  Height: 4' 10" (147.3 " cm)  Weight: 56.1 kg (123 lb 12.6 oz)  BSA (Calculated - sq m): 1.52 sq meters  BMI (Calculated): 25.9  Weight in (lb) to have BMI = 25: 119.4]  Wt Readings from Last 2 Encounters:   09/06/19 56.1 kg (123 lb 12.6 oz)   09/05/19 55.8 kg (123 lb 0.3 oz)       Physical Exam   Constitutional: She is oriented to person, place, and time. She appears well-developed and well-nourished.   HENT:   Head: Normocephalic.   Neck: Neck supple.   Cardiovascular: Normal rate and regular rhythm.   Pulmonary/Chest: Normal expansion and effort normal. No stridor. No respiratory distress. She exhibits no tenderness.   Breath sounds decreased at bilateral bases   Abdominal: Soft.   Musculoskeletal: She exhibits edema. She exhibits no tenderness.   Lymphadenopathy:     She has no cervical adenopathy.   Neurological: She is alert and oriented to person, place, and time. Gait normal.   Skin: Skin is warm.   Psychiatric: She has a normal mood and affect. Her behavior is normal. Judgment and thought content normal.   Nursing note and vitals reviewed.      Laboratory  Lab Results   Component Value Date    WBC 10.19 08/23/2019    RBC 2.94 (L) 08/23/2019    HGB 9.1 (L) 08/23/2019    HCT 28.1 (L) 08/23/2019    MCV 96 08/23/2019    MCH 31.0 08/23/2019    MCHC 32.4 08/23/2019    RDW 15.2 (H) 08/23/2019     08/23/2019    MPV 9.7 08/23/2019    GRAN 8.1 (H) 08/23/2019    GRAN 79.7 (H) 08/23/2019    LYMPH 0.9 (L) 08/23/2019    LYMPH 8.8 (L) 08/23/2019    MONO 0.8 08/23/2019    MONO 7.9 08/23/2019    EOS 0.3 08/23/2019    BASO 0.05 08/23/2019    EOSINOPHIL 3.3 08/23/2019    BASOPHIL 0.5 08/23/2019       BMP  Lab Results   Component Value Date     08/23/2019    K 3.3 (L) 08/23/2019    CL 99 08/23/2019    CO2 33 (H) 08/23/2019    BUN 31 (H) 08/23/2019    CREATININE 1.0 08/23/2019    CALCIUM 9.0 08/23/2019    ANIONGAP 9 08/23/2019    ESTGFRAFRICA >60 08/23/2019    EGFRNONAA 56 (A) 08/23/2019    AST 19 08/22/2019    ALT 27 08/22/2019    PROT  7.2 08/22/2019       Lab Results   Component Value Date     (H) 08/22/2019     (H) 08/19/2019       No results found for: TSH    No results found for: SEDRATE    No results found for: CRP      Diagnostic Results:  I have personally reviewed today the following studies:    Chest x-ray 09/05/2019 improved right-sided effusion.    PFT September 2019 shows moderate restriction and moderate DLCO reduction.    6 min walking test September 2019 lowest O2 sat on exertion 90%.  No need for O2.  Exercise capacity moderately reduced    In-lab polysomnography showed moderate DONAL.  AHI 23 events per hour.  No optimal titration reached.  Suggested auto CPAP trial.    Assessment/Plan:   Congestive heart failure, unspecified HF chronicity, unspecified heart failure type  -     X-Ray Chest PA And Lateral; Future; Expected date: 11/06/2019    Nausea  -     Basic metabolic panel; Future; Expected date: 09/06/2019  -     Magnesium; Future; Expected date: 09/06/2019    DONAL (obstructive sleep apnea)    Nocturnal hypoxemia    Pleural effusion on right  -     X-Ray Chest PA And Lateral; Future; Expected date: 11/06/2019    Restrictive lung disease      Advised patient to use CPAP during sleep with O2 bleed in.    Continue diuresis as per Cardiology.  Staff to obtain cardiac catheterization reports an echo reports from Dr. Andrew Laguerre / Ben[facility] with [facility].    Restriction likely due pleural effusion / pulm edema + Pulm HTN causing decreased DLCO.     Small right pleural effusion.  No need for thoracentesis.  700 mL transudative effusion removed on the right side with thoracentesis in August.  Repeat chest x-ray next visit    Up-to-date on pneumococcal vaccines and influenza vaccination.    I did reach DME personnel regarding delivering patient CPAP.    Follow up in about 2 months (around 11/6/2019).    This note was prepared using voice recognition system and is likely to have sound alike errors that may have been  overlooked even after proof reading.  Please call me with any questions    Discussed diagnosis, its evaluation, treatment and usual course. All questions answered.      John Landry MD

## 2019-09-07 ENCOUNTER — TELEPHONE (OUTPATIENT)
Dept: PULMONOLOGY | Facility: CLINIC | Age: 74
End: 2019-09-07

## 2019-09-07 NOTE — TELEPHONE ENCOUNTER
Pt has potassium 40 meq at home , advised 1 tab daily x 2 days . She will follow with cards . In past she had high K while taking supplements of K + .

## 2019-09-09 LAB
BRPFT: ABNORMAL
DLCO ADJ PRE: 7.72 ML/(MIN*MMHG) (ref 11.16–22.63)
DLCO SINGLE BREATH LLN: 11.16
DLCO SINGLE BREATH PRE REF: 41.6 %
DLCO SINGLE BREATH REF: 16.9
DLCOC SBVA LLN: 2.49
DLCOC SBVA PRE REF: 112.9 %
DLCOC SBVA REF: 4.32
DLCOC SINGLE BREATH LLN: 11.16
DLCOC SINGLE BREATH PRE REF: 45.7 %
DLCOC SINGLE BREATH REF: 16.9
DLCOVA LLN: 2.49
DLCOVA PRE REF: 102.7 %
DLCOVA PRE: 4.44 ML/(MIN*MMHG*L) (ref 2.49–6.15)
DLCOVA REF: 4.32
DLVAADJ PRE: 4.88 ML/(MIN*MMHG*L) (ref 2.49–6.15)
ERV LLN: 0.52
ERV PRE REF: 69.5 %
ERV REF: 0.52
FEF 25 75 LLN: 0.66
FEF 25 75 PRE REF: 76.4 %
FEF 25 75 REF: 1.49
FEV1 FVC LLN: 64
FEV1 FVC PRE REF: 107.9 %
FEV1 FVC REF: 78
FEV1 LLN: 1.2
FEV1 PRE REF: 50.6 %
FEV1 REF: 1.68
FRCPLETH LLN: 1.54
FRCPLETH PREREF: 70.9 %
FRCPLETH REF: 2.37
FVC LLN: 1.54
FVC PRE REF: 46.6 %
FVC REF: 2.15
IVC PRE: 0.94 L (ref 1.54–2.76)
IVC SINGLE BREATH LLN: 1.54
IVC SINGLE BREATH PRE REF: 43.4 %
IVC SINGLE BREATH REF: 2.15
MVV LLN: 46
MVV PRE REF: 64.5 %
MVV REF: 54
PEF LLN: 3.03
PEF PRE REF: 66.2 %
PEF REF: 4.43
PRE DLCO: 7.03 ML/(MIN*MMHG) (ref 11.16–22.63)
PRE ERV: 0.36 L (ref 0.52–0.52)
PRE FEF 25 75: 1.14 L/S (ref 0.66–2.32)
PRE FET 100: 7.21 SEC
PRE FEV1 FVC: 84.66 % (ref 64.39–92.49)
PRE FEV1: 0.85 L (ref 1.2–2.16)
PRE FRC PL: 1.68 L
PRE FVC: 1 L (ref 1.54–2.76)
PRE MVV: 35 L/MIN (ref 46.12–62.4)
PRE PEF: 2.93 L/S (ref 3.03–5.83)
PRE RV: 1.3 L (ref 1.27–2.42)
PRE TLC: 2.41 L (ref 2.92–4.9)
RAW LLN: 3.06
RAW PRE REF: 152.7 %
RAW PRE: 4.67 CMH2O*S/L (ref 3.06–3.06)
RAW REF: 3.06
RV LLN: 1.27
RV PRE REF: 70.3 %
RV REF: 1.84
RVTLC LLN: 35
RVTLC PRE REF: 122.1 %
RVTLC PRE: 53.88 % (ref 34.53–53.71)
RVTLC REF: 44
TLC LLN: 2.92
TLC PRE REF: 61.5 %
TLC REF: 3.91
VA PRE: 1.58 L (ref 3.76–3.76)
VA SINGLE BREATH LLN: 3.76
VA SINGLE BREATH PRE REF: 42.1 %
VA SINGLE BREATH REF: 3.76
VC LLN: 1.54
VC PRE REF: 51.5 %
VC PRE: 1.11 L (ref 1.54–2.76)
VC REF: 2.15
VTGRAWPRE: 1.52 L

## 2019-11-07 ENCOUNTER — HOSPITAL ENCOUNTER (OUTPATIENT)
Dept: RADIOLOGY | Facility: HOSPITAL | Age: 74
Discharge: HOME | End: 2019-11-07
Attending: INTERNAL MEDICINE
Payer: MEDICARE

## 2019-11-07 ENCOUNTER — CLINICAL SUPPORT (OUTPATIENT)
Dept: PULMONOLOGY | Facility: CLINIC | Age: 74
End: 2019-11-07
Payer: MEDICARE

## 2019-11-07 ENCOUNTER — OFFICE VISIT (OUTPATIENT)
Dept: PULMONOLOGY | Facility: CLINIC | Age: 74
End: 2019-11-07
Payer: MEDICARE

## 2019-11-07 VITALS
HEIGHT: 58 IN | BODY MASS INDEX: 22.61 KG/M2 | OXYGEN SATURATION: 95 % | SYSTOLIC BLOOD PRESSURE: 118 MMHG | DIASTOLIC BLOOD PRESSURE: 60 MMHG | RESPIRATION RATE: 17 BRPM | HEART RATE: 63 BPM | WEIGHT: 107.69 LBS

## 2019-11-07 DIAGNOSIS — J90 PLEURAL EFFUSION ON RIGHT: ICD-10-CM

## 2019-11-07 DIAGNOSIS — G47.34 NOCTURNAL HYPOXEMIA: ICD-10-CM

## 2019-11-07 DIAGNOSIS — J98.4 RESTRICTIVE LUNG DISEASE: ICD-10-CM

## 2019-11-07 DIAGNOSIS — I50.32 CHRONIC DIASTOLIC HEART FAILURE: ICD-10-CM

## 2019-11-07 DIAGNOSIS — G47.33 OSA ON CPAP: Primary | ICD-10-CM

## 2019-11-07 DIAGNOSIS — Z87.09 HISTORY OF PLEURAL EFFUSION: ICD-10-CM

## 2019-11-07 DIAGNOSIS — I50.9 CONGESTIVE HEART FAILURE, UNSPECIFIED HF CHRONICITY, UNSPECIFIED HEART FAILURE TYPE: ICD-10-CM

## 2019-11-07 DIAGNOSIS — I25.810 CORONARY ARTERY DISEASE INVOLVING CORONARY BYPASS GRAFT, ANGINA PRESENCE UNSPECIFIED, UNSPECIFIED WHETHER NATIVE OR TRANSPLANTED HEART: ICD-10-CM

## 2019-11-07 PROCEDURE — 71046 X-RAY EXAM CHEST 2 VIEWS: CPT | Mod: TC

## 2019-11-07 PROCEDURE — 94618 PULMONARY STRESS TESTING: ICD-10-PCS | Mod: 26,S$PBB,, | Performed by: INTERNAL MEDICINE

## 2019-11-07 PROCEDURE — 71046 XR CHEST PA AND LATERAL: ICD-10-PCS | Mod: 26,,, | Performed by: RADIOLOGY

## 2019-11-07 PROCEDURE — 94618 PULMONARY STRESS TESTING: CPT | Mod: 26,S$PBB,, | Performed by: INTERNAL MEDICINE

## 2019-11-07 PROCEDURE — 99999 PR PBB SHADOW E&M-EST. PATIENT-LVL III: CPT | Mod: PBBFAC,,, | Performed by: INTERNAL MEDICINE

## 2019-11-07 PROCEDURE — 99999 PR PBB SHADOW E&M-EST. PATIENT-LVL III: ICD-10-PCS | Mod: PBBFAC,,, | Performed by: INTERNAL MEDICINE

## 2019-11-07 PROCEDURE — 94618 PULMONARY STRESS TESTING: CPT | Mod: PBBFAC

## 2019-11-07 PROCEDURE — 99213 OFFICE O/P EST LOW 20 MIN: CPT | Mod: PBBFAC,25 | Performed by: INTERNAL MEDICINE

## 2019-11-07 PROCEDURE — 99214 OFFICE O/P EST MOD 30 MIN: CPT | Mod: 25,S$PBB,, | Performed by: INTERNAL MEDICINE

## 2019-11-07 PROCEDURE — 71046 X-RAY EXAM CHEST 2 VIEWS: CPT | Mod: 26,,, | Performed by: RADIOLOGY

## 2019-11-07 PROCEDURE — 99214 PR OFFICE/OUTPT VISIT, EST, LEVL IV, 30-39 MIN: ICD-10-PCS | Mod: 25,S$PBB,, | Performed by: INTERNAL MEDICINE

## 2019-11-07 RX ORDER — NAPROXEN SODIUM 220 MG
TABLET ORAL
COMMUNITY
Start: 2016-09-03

## 2019-11-07 RX ORDER — FLUOXETINE 20 MG/1
TABLET ORAL
Refills: 2 | COMMUNITY
Start: 2019-10-22 | End: 2020-01-08

## 2019-11-07 RX ORDER — SYRINGE AND NEEDLE,INSULIN,1ML 31 GX5/16"
SYRINGE, EMPTY DISPOSABLE MISCELLANEOUS
COMMUNITY
Start: 2019-09-06

## 2019-11-07 RX ORDER — POTASSIUM CHLORIDE 20 MEQ/1
20 TABLET, EXTENDED RELEASE ORAL 2 TIMES DAILY
Refills: 0 | COMMUNITY
Start: 2019-10-11

## 2019-11-07 RX ORDER — HYDRALAZINE HYDROCHLORIDE 100 MG/1
100 TABLET, FILM COATED ORAL 2 TIMES DAILY
Refills: 11 | COMMUNITY
Start: 2019-10-14

## 2019-11-07 RX ORDER — INSULIN DETEMIR 100 [IU]/ML
INJECTION, SOLUTION SUBCUTANEOUS
Refills: 5 | COMMUNITY
Start: 2019-10-25 | End: 2020-01-08

## 2019-11-07 NOTE — PROGRESS NOTES
Pulmonary Outpatient Follow Up Visit     Subjective:       Patient ID: Mercy Woodruff is a 74 y.o. female.    Chief Complaint: Sleep Apnea      HPI        74-year-old female patient presenting for 2 month follow-up.    Initially evaluated for pleural effusions, shortness of breath, status post thoracentesis 700 mL of transudative fluid drained, patient right-sided thoracentesis August 2019 was complicated with re-expansion pulmonary edema had to be admitted for observation in ICU on BiPAP.    Feels well no SOB.  Chest x-ray today reviewed resolved bilateral effusions.    Afebrile.    Underwent in-lab polysomnography showed moderate DONAL with an AHI of 23 events per hour with severe hypoxemia.  No optimal pressure was achieved.    Patient compliant with CPAP therapy.  [place] Sleepiness Scale score today 3      Review of Systems   Constitutional: Negative for fever and chills.   HENT: Negative for nosebleeds.    Eyes: Negative for redness.   Respiratory: Positive for apnea, snoring, dyspnea on extertion and somnolence. Negative for choking and shortness of breath.    Cardiovascular: Negative for leg swelling.        CAD    Genitourinary: Negative for hematuria.   Endocrine: Negative for cold intolerance.    Musculoskeletal: Positive for arthralgias.   Skin: Negative for rash.   Gastrointestinal: Negative for vomiting.   Neurological: Negative for syncope.   Hematological: Negative for adenopathy.   Psychiatric/Behavioral: Positive for sleep disturbance. Negative for confusion.       Outpatient Encounter Medications as of 11/7/2019   Medication Sig Dispense Refill    aspirin 81 MG Chew Take 81 mg by mouth once daily.       atorvastatin (LIPITOR) 40 MG tablet Take 40 mg by mouth once daily.      calcium carbonate (OS-ULICES) 500 mg calcium (1,250 mg) tablet Take 1 tablet by mouth 2 (two) times daily.      carvedilol (COREG) 25 MG tablet Take 25 mg by mouth 2 (two) times daily  "with meals.      econazole nitrate 1 % cream Apply 1 Doses/Fill topically as needed.      FLUoxetine 20 MG tablet   2    fluoxetine HCl (PROZAC ORAL) Take 30 mg by mouth once daily.       furosemide (LASIX) 40 MG tablet Take 40 mg by mouth 2 (two) times daily.      furosemide (LASIX) 40 MG tablet Take 1 tablet (40 mg total) by mouth 2 (two) times daily. 60 tablet 11    glucosamine sulfate 500 mg Tab Take 1 capsule by mouth 2 (two) times daily.      hydrALAZINE (APRESOLINE) 100 MG tablet Take 100 mg by mouth 2 (two) times daily.  11    hydrALAZINE (APRESOLINE) 25 MG tablet Take 3 tablets (75 mg total) by mouth 2 (two) times daily. 180 tablet 11    insulin aspart U-100 (NOVOLOG) 100 unit/mL injection Inject 10 Units into the skin 3 (three) times daily before meals. Not taking      insulin detemir U-100 (LEVEMIR) 100 unit/mL injection Inject 25 Units into the skin once daily. Pt. Taking 18 units      insulin syringe-needle U-100 (BD INSULIN SYRINGE ULTRA-FINE) 0.5 mL 31 gauge x 5/16" Syrg       isosorbide mononitrate (IMDUR) 30 MG 24 hr tablet Take 30 mg by mouth 2 (two) times daily.  11    LEVEMIR FLEXTOUCH U-100 INSULN 100 unit/mL (3 mL) InPn pen   5    losartan (COZAAR) 50 MG tablet Take 50 mg by mouth 2 (two) times daily.       magnesium oxide (MAG-OX) 400 mg (241.3 mg magnesium) tablet Take 400 mg by mouth 2 (two) times daily.      metOLazone (ZAROXOLYN) 2.5 MG tablet Take 1 tablet (2.5 mg total) by mouth every other day. 15 tablet 1    NIFEdipine (PROCARDIA XL) 60 MG (OSM) 24 hr tablet Take 60 mg by mouth 2 (two) times daily.      pantoprazole (PROTONIX) 40 MG tablet Take 40 mg by mouth every other day.      potassium chloride SA (K-DUR,KLOR-CON) 20 MEQ tablet Take 20 mEq by mouth once daily.  0    SURE COMFORT INSULIN SYRINGE 1 mL 31 gauge x 5/16 Syrg       ascorbic acid, vitamin C, (VITAMIN C) 500 MG tablet Take 500 mg by mouth once daily.      aspirin 325 MG tablet Take 1 tablet (325 mg " "total) by mouth once daily. (Patient not taking: Reported on 11/7/2019)  0    famotidine (PEPCID) 20 MG tablet Take 1 tablet (20 mg total) by mouth once daily. (Patient not taking: Reported on 11/7/2019) 30 tablet 11    nitroGLYCERIN (NITROSTAT) 0.4 MG SL tablet Take 0.4 mg by mouth once daily.  0    vitamin D (VITAMIN D3) 1000 units Tab Take 1,000 Units by mouth every evening.       No facility-administered encounter medications on file as of 11/7/2019.        Objective:     Vital Signs (Most Recent)  Vital Signs  Pulse: 63  Resp: 17  SpO2: 95 %  BP: 118/60  Height and Weight  Height: 4' 10" (147.3 cm)  Weight: 48.9 kg (107 lb 11.1 oz)  BSA (Calculated - sq m): 1.41 sq meters  BMI (Calculated): 22.5  Weight in (lb) to have BMI = 25: 119.4]  Wt Readings from Last 2 Encounters:   11/07/19 48.9 kg (107 lb 11.1 oz)   09/06/19 56.1 kg (123 lb 12.6 oz)       Physical Exam   Constitutional: She is oriented to person, place, and time. She appears well-developed and well-nourished.   HENT:   Head: Normocephalic.   Neck: Neck supple.   Cardiovascular: Normal rate and regular rhythm.   Pulmonary/Chest: Normal expansion and effort normal. No stridor. No respiratory distress. She exhibits no tenderness.   Breath sounds decreased at bilateral bases   Abdominal: Soft.   Musculoskeletal: She exhibits no edema or tenderness.   Lymphadenopathy:     She has no cervical adenopathy.   Neurological: She is alert and oriented to person, place, and time. Gait normal.   Skin: Skin is warm. No cyanosis. Nails show no clubbing.   Psychiatric: She has a normal mood and affect. Her behavior is normal. Judgment and thought content normal.   Nursing note and vitals reviewed.      Laboratory  Lab Results   Component Value Date    WBC 10.19 08/23/2019    RBC 2.94 (L) 08/23/2019    HGB 9.1 (L) 08/23/2019    HCT 28.1 (L) 08/23/2019    MCV 96 08/23/2019    MCH 31.0 08/23/2019    MCHC 32.4 08/23/2019    RDW 15.2 (H) 08/23/2019     08/23/2019 "    MPV 9.7 08/23/2019    GRAN 8.1 (H) 08/23/2019    GRAN 79.7 (H) 08/23/2019    LYMPH 0.9 (L) 08/23/2019    LYMPH 8.8 (L) 08/23/2019    MONO 0.8 08/23/2019    MONO 7.9 08/23/2019    EOS 0.3 08/23/2019    BASO 0.05 08/23/2019    EOSINOPHIL 3.3 08/23/2019    BASOPHIL 0.5 08/23/2019       BMP  Lab Results   Component Value Date     (L) 09/06/2019    K 3.0 (L) 09/06/2019    CL 86 (L) 09/06/2019    CO2 34 (H) 09/06/2019    BUN 31 (H) 09/06/2019    CREATININE 1.3 09/06/2019    CALCIUM 9.8 09/06/2019    ANIONGAP 10 09/06/2019    ESTGFRAFRICA 46.7 (A) 09/06/2019    EGFRNONAA 40.5 (A) 09/06/2019    AST 19 08/22/2019    ALT 27 08/22/2019    PROT 7.2 08/22/2019       Lab Results   Component Value Date     (H) 08/22/2019     (H) 08/19/2019       No results found for: TSH    No results found for: SEDRATE    No results found for: CRP      Diagnostic Results:  I have personally reviewed today the following studies:    Chest x-ray 09/05/2019 improved right-sided effusion.     PFT September 2019 shows moderate restriction and moderate DLCO reduction.     6 min walking test September 2019 lowest O2 sat on exertion 90%.  No need for O2.  Exercise capacity moderately reduced     In-lab polysomnography August 2019 showed moderate DONAL.  AHI 23 events per hour.  No optimal titration reached.  Suggested auto CPAP trial.    Compliance Summary on 4-20 cm water.  10/1/2019 - 10/30/2019 (30 days)  Days with Device Usage 30 days  Days without Device Usage 0 days  Percent Days with Device Usage 100.0%  Cumulative Usage 9 days 5 hrs. 26 mins. 36 secs.  Maximum Usage (1 Day) 10 hrs. 33 mins. 14 secs.  Average Usage (All Days) 7 hrs. 22 mins. 53 secs.  Average Usage (Days Used) 7 hrs. 22 mins. 53 secs.  Minimum Usage (1 Day) 1 hrs. 51 mins. 19 secs.  Percent of Days with Usage >= 4 Hours 96.7%  Percent of Days with Usage < 4 Hours 3.3%  Date Range  Total Blower Time 9 days 5 hrs. 32 mins. 51 secs.  Average AHI 11.1  Auto-CPAP  Summary  Auto-CPAP Mean Pressure 10.3 cmH2O  Auto-CPAP Peak Average Pressure 18.3 cmH2O  Average Device Pressure <= 90% of Time 13.9 cmH2O  Average Time in Large Leak Per Day 10 secs.      Right-sided cardiology from [facility]/cardiology report under media uploaded 10/22/2019    AYSE EF within normal.   + CAD.  Status post CABG.    Assessment/Plan:   DONAL on CPAP  -     HME - OTHER    Nocturnal hypoxemia  -     PULSE OXIMETRY OVERNIGHT; Future    History of pleural effusion    Chronic diastolic heart failure    Coronary artery disease involving coronary bypass graft, angina presence unspecified, unspecified whether native or transplanted heart      Encourage patient to continue CPAP compliance.    Change pressure to 10-20 cm water.  Overnight pulse oximetry on CPAP 10-20 cm water and 2 L of O2.    Continue aspirin, statin, beta-blocker and nitrate.    Continue Lasix.    Pleural effusion not recurrent on current chest x-ray.    Up-to-date on influenza and Prevnar 13 vaccine.  P PSV 23 next year.    Follow-up with cardiology.        Follow up in about 3 months (around 2/7/2020).    This note was prepared using voice recognition system and is likely to have sound alike errors that may have been overlooked even after proof reading.  Please call me with any questions    Discussed diagnosis, its evaluation, treatment and usual course. All questions answered.      John Landry MD

## 2019-11-08 NOTE — PROCEDURES
[facility]  [address] * [place] [address]  Telephone: [phone]  Test date: 19 Start: 19 22:41:33 Mercy Woodruff  Doctor: Dr. Landry End: 19 06:37:37 [id number]  Oximetry: Summary Report  Comments: CPAP 10 cmH2O, 2 lpm  Recording time: 07:56:04 Highest pulse: 234 Highest SpO2: 98%  Excluded samplin:13:12 Lowest pulse: 50 Lowest SpO2: 75%  Total valid samplin:42:52 Mean pulse: 58 Mean SpO2: 94.2%  1 S.D.: 5.3 1 S.D.: 2.9  Time with SpO2<90: 0:17:56, 3.9%  Time with SpO2<80: 0:08:00, 1.7%  Time with SpO2<70: 0:00:00, 0.0%  Time with SpO2<60: 0:00:00, 0.0%  Time with SpO2<89: 0:16:32, 3.6%  Time with SpO2 =>90: 7:24:56, 96.1%  Time with SpO2=>80 & <90: 0:09:56, 2.1%  Time with SpO2=>70 & <80: 0:08:00, 1.7%  Time with SpO2=>60 & <70: 0:00:00, 0.0%  The longest continuous time with saturation <=88 was 00:02:28, which started at  19 03:14:01.  A desaturation event was defined as a decrease of saturation by 4 or more.  3 events were excluded due to artifact.  There were 4 desaturation events over 3 minutes duration.  There were 24 desaturation events of less than 3 minutes duration during which:  The mean high was 96.3%. The mean low was 90.0%.  The number of these events that were:  > 0 & <10 seconds: 0 > 0 seconds: 24  =>10 & <20 seconds: 8 =>10 seconds: 24  =>20 & <30 seconds: 11 =>20 seconds: 16  =>30 & <40 seconds: 1 =>30 seconds: 5  =>40 & <50 seconds: 1 =>40 seconds: 4  =>50 & <60 seconds: 3 =>50 seconds: 3  =>60 seconds: 0 =>60 seconds: 0  The mean length of desaturation events that were >=10 sec & <=3 mins was: 25.8 sec.  Desaturation event index (events >=10 sec per sampled hour): 3.1  Desaturation event index (events >= 0 sec per sampled hour): 3.1

## 2019-11-12 ENCOUNTER — TELEPHONE (OUTPATIENT)
Dept: PULMONOLOGY | Facility: CLINIC | Age: 74
End: 2019-11-12

## 2019-11-12 NOTE — TELEPHONE ENCOUNTER
----- Message from John Landry MD sent at 11/8/2019  3:58 PM CST -----  Please inform patient she needs to increase her O2 during sleep via CPAP to 3 liters/minute.  Thank you

## 2020-01-08 PROBLEM — E78.5 HYPERLIPIDEMIA: Status: ACTIVE | Noted: 2019-08-22

## 2020-01-21 ENCOUNTER — TELEPHONE (OUTPATIENT)
Dept: OBSTETRICS AND GYNECOLOGY | Facility: CLINIC | Age: 75
End: 2020-01-21

## 2020-01-21 DIAGNOSIS — Z12.31 BREAST CANCER SCREENING BY MAMMOGRAM: Primary | ICD-10-CM

## 2020-01-21 NOTE — TELEPHONE ENCOUNTER
Returned pt call to schedule mammogram. Spoke with Pt's daughter. Confirmed 2/5/20 at 11:00 am (ON). Daughter verbalized understanding.

## 2020-01-21 NOTE — TELEPHONE ENCOUNTER
----- Message from Denise Moeller sent at 1/21/2020 12:05 PM CST -----  Contact: Geovanna-daughter  Would like to consult with nurse regarding pt getting mammo same day as appt on 2/19/20. Please give a call back at [phone].          Thanks,  Denise PHILLIPS

## 2020-02-05 ENCOUNTER — HOSPITAL ENCOUNTER (OUTPATIENT)
Dept: RADIOLOGY | Facility: HOSPITAL | Age: 75
Discharge: HOME | End: 2020-02-05
Attending: OBSTETRICS & GYNECOLOGY
Payer: MEDICARE

## 2020-02-05 VITALS — HEIGHT: 58 IN | BODY MASS INDEX: 22.21 KG/M2 | WEIGHT: 105.81 LBS

## 2020-02-05 DIAGNOSIS — Z12.31 BREAST CANCER SCREENING BY MAMMOGRAM: ICD-10-CM

## 2020-02-05 PROCEDURE — 77067 SCR MAMMO BI INCL CAD: CPT | Mod: 26,,, | Performed by: RADIOLOGY

## 2020-02-05 PROCEDURE — 77067 SCR MAMMO BI INCL CAD: CPT | Mod: TC

## 2020-02-05 PROCEDURE — 77067 MAMMO DIGITAL SCREENING BILAT WITH CAD: ICD-10-PCS | Mod: 26,,, | Performed by: RADIOLOGY

## 2020-02-07 ENCOUNTER — HOSPITAL ENCOUNTER (OUTPATIENT)
Dept: RADIOLOGY | Facility: HOSPITAL | Age: 75
Discharge: HOME | End: 2020-02-07
Attending: INTERNAL MEDICINE
Payer: MEDICARE

## 2020-02-07 ENCOUNTER — OFFICE VISIT (OUTPATIENT)
Dept: PULMONOLOGY | Facility: CLINIC | Age: 75
End: 2020-02-07
Payer: MEDICARE

## 2020-02-07 VITALS
OXYGEN SATURATION: 93 % | RESPIRATION RATE: 20 BRPM | SYSTOLIC BLOOD PRESSURE: 144 MMHG | HEIGHT: 58 IN | BODY MASS INDEX: 22.08 KG/M2 | HEART RATE: 70 BPM | DIASTOLIC BLOOD PRESSURE: 64 MMHG | WEIGHT: 105.19 LBS

## 2020-02-07 DIAGNOSIS — Z87.09 HISTORY OF PLEURAL EFFUSION: Primary | ICD-10-CM

## 2020-02-07 DIAGNOSIS — G47.34 NOCTURNAL HYPOXEMIA: ICD-10-CM

## 2020-02-07 DIAGNOSIS — Z87.09 HISTORY OF PLEURAL EFFUSION: ICD-10-CM

## 2020-02-07 DIAGNOSIS — Z87.448 HISTORY OF RENAL FAILURE: ICD-10-CM

## 2020-02-07 DIAGNOSIS — G47.33 OSA ON CPAP: ICD-10-CM

## 2020-02-07 DIAGNOSIS — I50.32 CHRONIC DIASTOLIC HEART FAILURE: ICD-10-CM

## 2020-02-07 PROCEDURE — 99999 PR PBB SHADOW E&M-EST. PATIENT-LVL IV: CPT | Mod: PBBFAC,,, | Performed by: INTERNAL MEDICINE

## 2020-02-07 PROCEDURE — 99214 OFFICE O/P EST MOD 30 MIN: CPT | Mod: PBBFAC,25 | Performed by: INTERNAL MEDICINE

## 2020-02-07 PROCEDURE — 71046 XR CHEST PA AND LATERAL: ICD-10-PCS | Mod: 26,,, | Performed by: RADIOLOGY

## 2020-02-07 PROCEDURE — 99215 OFFICE O/P EST HI 40 MIN: CPT | Mod: S$PBB,,, | Performed by: INTERNAL MEDICINE

## 2020-02-07 PROCEDURE — 99999 PR PBB SHADOW E&M-EST. PATIENT-LVL IV: ICD-10-PCS | Mod: PBBFAC,,, | Performed by: INTERNAL MEDICINE

## 2020-02-07 PROCEDURE — 99215 PR OFFICE/OUTPT VISIT, EST, LEVL V, 40-54 MIN: ICD-10-PCS | Mod: S$PBB,,, | Performed by: INTERNAL MEDICINE

## 2020-02-07 PROCEDURE — 71046 X-RAY EXAM CHEST 2 VIEWS: CPT | Mod: 26,,, | Performed by: RADIOLOGY

## 2020-02-07 PROCEDURE — 71046 X-RAY EXAM CHEST 2 VIEWS: CPT | Mod: TC

## 2020-02-07 RX ORDER — CARVEDILOL 3.12 MG/1
6.25 TABLET ORAL 2 TIMES DAILY WITH MEALS
COMMUNITY

## 2020-02-07 RX ORDER — FUROSEMIDE 20 MG/1
20 TABLET ORAL DAILY
COMMUNITY

## 2020-02-07 RX ORDER — LORATADINE 10 MG/1
10 TABLET ORAL DAILY
COMMUNITY

## 2020-02-07 NOTE — PROGRESS NOTES
Pulmonary Outpatient Follow Up Visit     Subjective:       Patient ID: Mercy Woodruff is a 74 y.o. female.    Chief Complaint: Sleep Apnea      HPI    74-year-old female patient presenting for 4 months follow-up.    Initially evaluated summer of 2019 for shortness of breath, found to have pleural effusion status post thoracentesis 700 mL transudative fluid drain on the right side.    Thoracentesis was complicated by hypoxemia needed BiPAP admitted overnight to ICU and discharged next day.    Patient had significant event January 2019 she did receive extra Lasix at home for shortness of breath, evaluated by Cardiology underwent CT chest with contrast at [place], complicated by acute kidney injury.    Lasix now is 20 mg daily down from 40 mg twice a day.  Zaroxolyn was discontinued.    Records under care everywhere at [facility] after admission for CARSON January 2020 were reviewed.    On CPAP with O2 during sleep.    Good compliance.  Has a nasal irritation at the mask site.  Change her mask today.         Review of Systems   Constitutional: Negative for fever and chills.   HENT: Positive for congestion. Negative for nosebleeds.    Eyes: Negative for redness.   Respiratory: Positive for apnea, snoring, orthopnea, previous hospitalization due to pulmonary problems, asthma nighttime symptoms, dyspnea on extertion, somnolence and Paroxysmal Nocturnal Dyspnea. Negative for choking and shortness of breath.    Cardiovascular: Positive for leg swelling.        CAD    Genitourinary: Negative for hematuria.        Recent CARSON   Endocrine: Diabetes mellitus Negative for cold intolerance.    Musculoskeletal: Positive for arthralgias.   Skin: Negative for rash.   Gastrointestinal: Negative for vomiting.   Neurological: Negative for syncope.   Hematological: Negative for adenopathy.   Psychiatric/Behavioral: Positive for sleep disturbance. Negative for confusion.       Outpatient  "Encounter Medications as of 2/7/2020   Medication Sig Dispense Refill    aspirin 81 MG Chew Take 81 mg by mouth once daily.       atorvastatin (LIPITOR) 40 MG tablet Take 40 mg by mouth once daily.      carvediloL (COREG) 3.125 MG tablet Take 3.125 mg by mouth 2 (two) times daily with meals.      diphenhydrAMINE-acetaminophen (TYLENOL PM)  mg Tab Take 1 tablet by mouth nightly as needed.      econazole nitrate 1 % cream Apply 1 Doses/Fill topically as needed.      FLUoxetine (PROZAC) 20 MG capsule Take 1 capsule (20 mg total) by mouth once daily. 30 capsule 5    furosemide (LASIX) 20 MG tablet Take 20 mg by mouth once daily.      hydrALAZINE (APRESOLINE) 100 MG tablet Take 100 mg by mouth 2 (two) times daily.  11    insulin detemir U-100 (LEVEMIR) 100 unit/mL injection Inject 25 Units into the skin once daily.       insulin syringe-needle U-100 (BD INSULIN SYRINGE ULTRA-FINE) 0.5 mL 31 gauge x 5/16" Syrg       loratadine (CLARITIN) 10 mg tablet Take 10 mg by mouth once daily.      NIFEdipine (PROCARDIA XL) 60 MG (OSM) 24 hr tablet Take 60 mg by mouth 2 (two) times daily.      nitroGLYCERIN (NITROSTAT) 0.4 MG SL tablet Take 0.4 mg by mouth once daily.  0    ondansetron (ZOFRAN) 8 MG tablet Take by mouth every 8 (eight) hours as needed for Nausea.      pantoprazole (PROTONIX) 40 MG tablet Take 40 mg by mouth every other day.      potassium chloride SA (K-DUR,KLOR-CON) 20 MEQ tablet Take 20 mEq by mouth 2 (two) times daily.   0    SURE COMFORT INSULIN SYRINGE 1 mL 31 gauge x 5/16 Syrg       SURE COMFORT PEN NEEDLE 31 gauge x 5/16" Ndle       carvedilol (COREG) 25 MG tablet Take 25 mg by mouth 2 (two) times daily with meals.      FLUoxetine 20 MG tablet Take 20 mg by mouth once daily. 1.5 tablets po QD      furosemide (LASIX) 40 MG tablet Take 1 tablet (40 mg total) by mouth 2 (two) times daily. (Patient not taking: Reported on 2/7/2020) 60 tablet 11    isosorbide mononitrate (IMDUR) 30 MG 24 " "hr tablet Take 30 mg by mouth once daily.   11    metOLazone (ZAROXOLYN) 2.5 MG tablet Take 2.5 mg by mouth every other day.       No facility-administered encounter medications on file as of 2/7/2020.        Objective:     Vital Signs (Most Recent)  Vital Signs  Pulse: 70  Resp: 20  SpO2: (!) 93 %  BP: (!) 144/64  Height and Weight  Height: 4' 10" (147.3 cm)  Weight: 47.7 kg (105 lb 2.6 oz)  BSA (Calculated - sq m): 1.4 sq meters  BMI (Calculated): 22  Weight in (lb) to have BMI = 25: 119.4]  Wt Readings from Last 2 Encounters:   02/07/20 47.7 kg (105 lb 2.6 oz)   02/05/20 48 kg (105 lb 13.1 oz)       Physical Exam   Constitutional: She is oriented to person, place, and time. She appears well-developed and well-nourished.   HENT:   Head: Normocephalic.   Neck: Neck supple.   Cardiovascular: Normal rate and regular rhythm.   Pulmonary/Chest: Normal expansion and effort normal. No stridor. No respiratory distress. She has decreased breath sounds. She exhibits no tenderness.   Breath sounds decreased significantly at the right lower 2/3 lung field.   Abdominal: Soft.   Musculoskeletal: She exhibits no edema or tenderness.   Lymphadenopathy:     She has no cervical adenopathy.   Neurological: She is alert and oriented to person, place, and time. Gait normal.   Skin: Skin is warm. No cyanosis. Nails show no clubbing.   Psychiatric: She has a normal mood and affect. Her behavior is normal. Judgment and thought content normal.   Nursing note and vitals reviewed.      Laboratory  Lab Results   Component Value Date    WBC 10.19 08/23/2019    RBC 2.94 (L) 08/23/2019    HGB 9.1 (L) 08/23/2019    HCT 28.1 (L) 08/23/2019    MCV 96 08/23/2019    MCH 31.0 08/23/2019    MCHC 32.4 08/23/2019    RDW 15.2 (H) 08/23/2019     08/23/2019    MPV 9.7 08/23/2019    GRAN 8.1 (H) 08/23/2019    GRAN 79.7 (H) 08/23/2019    LYMPH 0.9 (L) 08/23/2019    LYMPH 8.8 (L) 08/23/2019    MONO 0.8 08/23/2019    MONO 7.9 08/23/2019    EOS 0.3 " 08/23/2019    BASO 0.05 08/23/2019    EOSINOPHIL 3.3 08/23/2019    BASOPHIL 0.5 08/23/2019       BMP  Lab Results   Component Value Date     01/09/2020    K 4.3 01/09/2020    CL 96 01/09/2020    CO2 22 01/09/2020    BUN 43 (H) 01/09/2020    CREATININE 1.52 (H) 01/09/2020    CALCIUM 9.2 01/09/2020    ANIONGAP 10 09/06/2019    ESTGFRAFRICA 46.7 (A) 09/06/2019    EGFRNONAA 34 (L) 01/09/2020    AST 13 01/09/2020    ALT 11 01/09/2020    PROT 6.8 01/09/2020       Lab Results   Component Value Date     (H) 08/22/2019     (H) 08/19/2019       No results found for: TSH    No results found for: SEDRATE    No results found for: CRP  No results found for: IGE     No results found for: ASPERGILLUS  No results found for: AFUMIGATUSCL     No results found for: ACE     Diagnostic Results:  I have personally reviewed today the following studies:    Compliance Summary  1/6/2020 - 2/4/2020 (30 days)  Days with Device Usage 30 days  Days without Device Usage 0 days  Percent Days with Device Usage 100.0%  Cumulative Usage 8 days 9 hrs. 40 mins. 45 secs.  Maximum Usage (1 Day) 12 hrs. 10 mins. 48 secs.  Average Usage (All Days) 6 hrs. 43 mins. 21 secs.  Average Usage (Days Used) 6 hrs. 43 mins. 21 secs.  Minimum Usage (1 Day) 5 mins. 10 secs.  Percent of Days with Usage >= 4 Hours 80.0%  Percent of Days with Usage < 4 Hours 20.0%  Date Range  Total Blower Time 8 days 9 hrs. 44 mins. 8 secs.  Average AHI 8.4  Auto-CPAP Summary  Auto-CPAP Mean Pressure 14.7 cmH2O  Auto-CPAP Peak Average Pressure 18.0 cmH2O  Average Device Pressure <= 90% of Time 17.4 cmH2O  Average Time in Large Leak Per Day 1 mins. 18 secs      PFT September 2019 shows moderate restriction and moderate DLCO reduction.     6 min walking test September 2019 lowest O2 sat on exertion 90%.  No need for O2.  Exercise capacity moderately reduced     In-lab polysomnography August 2019 showed moderate DONAL.  AHI 23 events per hour.  No optimal titration  reached.  Suggested auto CPAP trial.         Right-sided cardiology from [facility]/cardiology report under media uploaded 10/22/2019     AYSE EF within normal.   + CAD.  Status post CABG.    Echocardiogram  1/2020 shows normal systolic function, moderate mitral   regurgitation, atrial septal defect with right ventricular enlargement.      CXR 1/2020 [facility]     Heart size stable. Prior sternotomy and CABG. Slight worsening of infiltrate at the right lung base. Small right effusion.      Assessment/Plan:   History of pleural effusion  -     X-Ray Chest PA And Lateral; Future; Expected date: 02/07/2020    Chronic diastolic heart failure    DONAL on CPAP    Nocturnal hypoxemia    History of renal failure      Repeat chest x-ray today.  Might need repeat thoracentesis.    Lasix 20 mg daily.    Continue CPAP with O2.    Advised patient to apply Neosporin on meant at the nasal irritation site.    Follow-up with Cardiology and Nephrology.    General weight loss/lifestyle modification strategies discussed (elicit support from others; identify saboteurs; non-food rewards).  Diet interventions: low calorie (1000 kCal/d) deficit diet      Follow up in about 6 weeks (around 3/20/2020).    This note was prepared using voice recognition system and is likely to have sound alike errors that may have been overlooked even after proof reading.  Please call me with any questions    Discussed diagnosis, its evaluation, treatment and usual course. All questions answered.      John Landry MD

## 2020-02-07 NOTE — PATIENT INSTRUCTIONS
Heart healthy diet  Limit fluid intake 50-60 oz   Daily weights and to notify clinic if weight increases by more than 3 lbs in 1 day or 5 lbs in 1 week.

## 2020-02-10 ENCOUNTER — TELEPHONE (OUTPATIENT)
Dept: PULMONOLOGY | Facility: CLINIC | Age: 75
End: 2020-02-10

## 2020-02-19 ENCOUNTER — OFFICE VISIT (OUTPATIENT)
Dept: OBSTETRICS AND GYNECOLOGY | Facility: CLINIC | Age: 75
End: 2020-02-19
Payer: MEDICARE

## 2020-02-19 VITALS
HEIGHT: 58 IN | BODY MASS INDEX: 22.45 KG/M2 | SYSTOLIC BLOOD PRESSURE: 122 MMHG | WEIGHT: 106.94 LBS | DIASTOLIC BLOOD PRESSURE: 74 MMHG

## 2020-02-19 DIAGNOSIS — D17.21 LIPOMA OF RIGHT AXILLA: ICD-10-CM

## 2020-02-19 DIAGNOSIS — Z01.419 WELL WOMAN EXAM WITH ROUTINE GYNECOLOGICAL EXAM: Primary | ICD-10-CM

## 2020-02-19 PROCEDURE — G0101 CA SCREEN;PELVIC/BREAST EXAM: HCPCS | Mod: S$PBB,,, | Performed by: OBSTETRICS & GYNECOLOGY

## 2020-02-19 PROCEDURE — 99999 PR PBB SHADOW E&M-EST. PATIENT-LVL II: CPT | Mod: PBBFAC,,, | Performed by: OBSTETRICS & GYNECOLOGY

## 2020-02-19 PROCEDURE — 99212 OFFICE O/P EST SF 10 MIN: CPT | Mod: PBBFAC,25 | Performed by: OBSTETRICS & GYNECOLOGY

## 2020-02-19 PROCEDURE — G0101 PR CA SCREEN;PELVIC/BREAST EXAM: ICD-10-PCS | Mod: S$PBB,,, | Performed by: OBSTETRICS & GYNECOLOGY

## 2020-02-19 PROCEDURE — G0101 CA SCREEN;PELVIC/BREAST EXAM: HCPCS | Mod: PBBFAC | Performed by: OBSTETRICS & GYNECOLOGY

## 2020-02-19 PROCEDURE — 99999 PR PBB SHADOW E&M-EST. PATIENT-LVL II: ICD-10-PCS | Mod: PBBFAC,,, | Performed by: OBSTETRICS & GYNECOLOGY

## 2020-02-19 NOTE — PROGRESS NOTES
Subjective:       Patient ID: Mercy Woodruff is a 75 y.o. female.    Chief Complaint:  Well Woman      History of Present Illness  HPI  Presents for well-woman exam.  Has not had a well-woman exam in a long time.  Has hx of hysterectomy/USO for AUB, all pathology benign.  She is not currently sexually active.  Patient has a right sided breast lump that has been present x 30 years.  She was told it is just a benign fatty tumor.  She recently lost 30 lbs related to her meds, and notices the lump more, but does not think it has really changed in size.   No hx of cervical dysplasia  MM20: benign  Colonoscopy: 2017: polyps present    GYN & OB History  No LMP recorded (exact date). Patient has had a hysterectomy.   Date of Last Pap: No result found    OB History    Para Term  AB Living   3 3 3         SAB TAB Ectopic Multiple Live Births                  # Outcome Date GA Lbr Balbir/2nd Weight Sex Delivery Anes PTL Lv   3 Term            2 Term            1 Term                Review of Systems  Review of Systems   Constitutional: Negative for fatigue, fever and unexpected weight change.   Gastrointestinal: Negative for abdominal pain, bloating, blood in stool, constipation, diarrhea, nausea and vomiting.   Endocrine: Negative for hot flashes.   Genitourinary: Negative for decreased libido, dysuria, flank pain, frequency, genital sores, hematuria, pelvic pain, urgency, vaginal bleeding, vaginal discharge, vaginal pain, urinary incontinence, postmenopausal bleeding and vaginal odor.   Integumentary:  Negative for rash, hair changes, breast mass, nipple discharge and breast skin changes.   Psychiatric/Behavioral: Negative for depression. The patient is not nervous/anxious.    Breast: Positive for lump (right breast).Negative for mass, mastodynia, nipple discharge and skin changes          Objective:    Physical Exam:   Constitutional: She is oriented to person, place, and time. She appears well-developed  and well-nourished. No distress.      Neck: Neck supple. No thyromegaly present.     Pulmonary/Chest: Right breast exhibits no inverted nipple, no mass, no nipple discharge, no skin change, no tenderness, no bleeding and no swelling. Left breast exhibits no inverted nipple, no mass, no nipple discharge, no skin change, no tenderness, no bleeding and no swelling. Breasts are symmetrical.            Abdominal: Soft. She exhibits no distension and no mass. There is no tenderness. There is no rebound and no guarding. Hernia confirmed negative in the right inguinal area and confirmed negative in the left inguinal area.     Genitourinary: Vagina normal. Pelvic exam was performed with patient supine. There is no rash, tenderness, lesion or injury on the right labia. There is no rash, tenderness, lesion or injury on the left labia. Uterus is absent. Right adnexum displays no mass, no tenderness and no fullness. Left adnexum displays no mass, no tenderness and no fullness. No erythema (mucosa pale and thin), tenderness, bleeding, rectocele, cystocele or unspecified prolapse of vaginal walls in the vagina. No foreign body in the vagina. No signs of injury around the vagina. No vaginal discharge found. Cervix exhibits absence.               Neurological: She is alert and oriented to person, place, and time.     Psychiatric: She has a normal mood and affect.          Assessment:       Mercy was seen today for well woman.    Diagnoses and all orders for this visit:    Well woman exam with routine gynecological exam    Lipoma of right axilla             Plan:      Pt no longer needs pap smears.  Needs a pelvic exam q 2 years.  Advised on annual CBE and annual mammogram.  Mass is most consistent with a lipoma.  It is superficial, soft, and per the patient, has been stable in size x 30 years.  MMG is benign.  She declines any surgical management of it, especially since it does not cause any problems for her.  RTC 2 years.

## 2023-07-07 ENCOUNTER — PATIENT MESSAGE (OUTPATIENT)
Dept: INFECTIOUS DISEASES | Facility: CLINIC | Age: 78
End: 2023-07-07
Payer: MEDICARE

## 2023-08-03 ENCOUNTER — PATIENT MESSAGE (OUTPATIENT)
Dept: RESEARCH | Facility: HOSPITAL | Age: 78
End: 2023-08-03
Payer: MEDICARE
